# Patient Record
Sex: FEMALE | Race: WHITE | Employment: OTHER | URBAN - METROPOLITAN AREA
[De-identification: names, ages, dates, MRNs, and addresses within clinical notes are randomized per-mention and may not be internally consistent; named-entity substitution may affect disease eponyms.]

---

## 2017-02-27 ENCOUNTER — OFFICE VISIT (OUTPATIENT)
Dept: SURGERY | Age: 82
End: 2017-02-27

## 2017-02-27 VITALS
BODY MASS INDEX: 16.73 KG/M2 | DIASTOLIC BLOOD PRESSURE: 73 MMHG | HEIGHT: 61 IN | SYSTOLIC BLOOD PRESSURE: 142 MMHG | WEIGHT: 88.6 LBS

## 2017-02-27 DIAGNOSIS — F17.200 TOBACCO USE DISORDER: ICD-10-CM

## 2017-02-27 DIAGNOSIS — I70.202 FEMORAL ARTERY OCCLUSION, LEFT (HCC): ICD-10-CM

## 2017-02-27 DIAGNOSIS — E78.5 HYPERLIPIDEMIA LDL GOAL <70: ICD-10-CM

## 2017-02-27 DIAGNOSIS — I73.9 CLAUDICATION OF LEFT LOWER EXTREMITY (HCC): Primary | ICD-10-CM

## 2017-02-27 PROCEDURE — G8484 FLU IMMUNIZE NO ADMIN: HCPCS | Performed by: NURSE PRACTITIONER

## 2017-02-27 PROCEDURE — G8399 PT W/DXA RESULTS DOCUMENT: HCPCS | Performed by: NURSE PRACTITIONER

## 2017-02-27 PROCEDURE — 4040F PNEUMOC VAC/ADMIN/RCVD: CPT | Performed by: NURSE PRACTITIONER

## 2017-02-27 PROCEDURE — G8598 ASA/ANTIPLAT THER USED: HCPCS | Performed by: NURSE PRACTITIONER

## 2017-02-27 PROCEDURE — G8419 CALC BMI OUT NRM PARAM NOF/U: HCPCS | Performed by: NURSE PRACTITIONER

## 2017-02-27 PROCEDURE — 93922 UPR/L XTREMITY ART 2 LEVELS: CPT | Performed by: NURSE PRACTITIONER

## 2017-02-27 PROCEDURE — 99213 OFFICE O/P EST LOW 20 MIN: CPT | Performed by: NURSE PRACTITIONER

## 2017-02-27 PROCEDURE — 1090F PRES/ABSN URINE INCON ASSESS: CPT | Performed by: NURSE PRACTITIONER

## 2017-02-27 PROCEDURE — 4004F PT TOBACCO SCREEN RCVD TLK: CPT | Performed by: NURSE PRACTITIONER

## 2017-02-27 PROCEDURE — G8427 DOCREV CUR MEDS BY ELIG CLIN: HCPCS | Performed by: NURSE PRACTITIONER

## 2017-02-27 PROCEDURE — 1123F ACP DISCUSS/DSCN MKR DOCD: CPT | Performed by: NURSE PRACTITIONER

## 2017-02-27 ASSESSMENT — ENCOUNTER SYMPTOMS: SHORTNESS OF BREATH: 1

## 2017-03-06 ENCOUNTER — OFFICE VISIT (OUTPATIENT)
Dept: INTERNAL MEDICINE CLINIC | Age: 82
End: 2017-03-06

## 2017-03-06 VITALS
DIASTOLIC BLOOD PRESSURE: 80 MMHG | RESPIRATION RATE: 16 BRPM | WEIGHT: 87.5 LBS | SYSTOLIC BLOOD PRESSURE: 142 MMHG | OXYGEN SATURATION: 95 % | BODY MASS INDEX: 16.52 KG/M2 | HEART RATE: 80 BPM | HEIGHT: 61 IN

## 2017-03-06 DIAGNOSIS — D47.3 ESSENTIAL THROMBOCYTOSIS (HCC): ICD-10-CM

## 2017-03-06 DIAGNOSIS — E78.5 HYPERLIPIDEMIA LDL GOAL <70: ICD-10-CM

## 2017-03-06 DIAGNOSIS — K21.9 GASTROESOPHAGEAL REFLUX DISEASE, ESOPHAGITIS PRESENCE NOT SPECIFIED: ICD-10-CM

## 2017-03-06 DIAGNOSIS — J41.0 SIMPLE CHRONIC BRONCHITIS (HCC): ICD-10-CM

## 2017-03-06 DIAGNOSIS — Z72.0 TOBACCO ABUSE: ICD-10-CM

## 2017-03-06 DIAGNOSIS — I10 ESSENTIAL HYPERTENSION: Primary | ICD-10-CM

## 2017-03-06 PROBLEM — Z79.899 ENCOUNTER FOR MONITORING DIURETIC THERAPY: Status: ACTIVE | Noted: 2017-03-06

## 2017-03-06 PROBLEM — Z51.81 ENCOUNTER FOR MONITORING STATIN THERAPY: Status: ACTIVE | Noted: 2017-03-06

## 2017-03-06 PROBLEM — Z79.899 ENCOUNTER FOR MONITORING STATIN THERAPY: Status: ACTIVE | Noted: 2017-03-06

## 2017-03-06 PROBLEM — Z51.81 ENCOUNTER FOR MONITORING DIURETIC THERAPY: Status: ACTIVE | Noted: 2017-03-06

## 2017-03-06 PROCEDURE — G8926 SPIRO NO PERF OR DOC: HCPCS | Performed by: INTERNAL MEDICINE

## 2017-03-06 PROCEDURE — 3023F SPIROM DOC REV: CPT | Performed by: INTERNAL MEDICINE

## 2017-03-06 PROCEDURE — 99213 OFFICE O/P EST LOW 20 MIN: CPT | Performed by: INTERNAL MEDICINE

## 2017-03-06 PROCEDURE — 4040F PNEUMOC VAC/ADMIN/RCVD: CPT | Performed by: INTERNAL MEDICINE

## 2017-03-06 PROCEDURE — G8427 DOCREV CUR MEDS BY ELIG CLIN: HCPCS | Performed by: INTERNAL MEDICINE

## 2017-03-06 PROCEDURE — G8419 CALC BMI OUT NRM PARAM NOF/U: HCPCS | Performed by: INTERNAL MEDICINE

## 2017-03-06 PROCEDURE — G8598 ASA/ANTIPLAT THER USED: HCPCS | Performed by: INTERNAL MEDICINE

## 2017-03-06 PROCEDURE — G8399 PT W/DXA RESULTS DOCUMENT: HCPCS | Performed by: INTERNAL MEDICINE

## 2017-03-06 PROCEDURE — 1090F PRES/ABSN URINE INCON ASSESS: CPT | Performed by: INTERNAL MEDICINE

## 2017-03-06 PROCEDURE — G8484 FLU IMMUNIZE NO ADMIN: HCPCS | Performed by: INTERNAL MEDICINE

## 2017-03-06 PROCEDURE — 1123F ACP DISCUSS/DSCN MKR DOCD: CPT | Performed by: INTERNAL MEDICINE

## 2017-03-06 PROCEDURE — 4004F PT TOBACCO SCREEN RCVD TLK: CPT | Performed by: INTERNAL MEDICINE

## 2017-03-06 ASSESSMENT — COPD QUESTIONNAIRES: COPD: 1

## 2017-03-06 ASSESSMENT — ENCOUNTER SYMPTOMS
SORE THROAT: 0
HEARTBURN: 0
SHORTNESS OF BREATH: 0
ORTHOPNEA: 0
NAUSEA: 0
WHEEZING: 0
SPUTUM PRODUCTION: 1
CHOKING: 0
BELCHING: 0
ABDOMINAL PAIN: 0
COUGH: 0
GLOBUS SENSATION: 0
FREQUENT THROAT CLEARING: 0
HOARSE VOICE: 0
BLURRED VISION: 0
DIFFICULTY BREATHING: 0
CHEST TIGHTNESS: 0

## 2017-03-06 ASSESSMENT — PATIENT HEALTH QUESTIONNAIRE - PHQ9
SUM OF ALL RESPONSES TO PHQ9 QUESTIONS 1 & 2: 0
2. FEELING DOWN, DEPRESSED OR HOPELESS: 0
SUM OF ALL RESPONSES TO PHQ QUESTIONS 1-9: 0
1. LITTLE INTEREST OR PLEASURE IN DOING THINGS: 0

## 2017-05-10 DIAGNOSIS — D47.3 ESSENTIAL THROMBOCYTOSIS (HCC): ICD-10-CM

## 2017-05-10 RX ORDER — ALENDRONATE SODIUM 70 MG/1
TABLET ORAL
Qty: 12 TABLET | Refills: 3 | Status: SHIPPED | OUTPATIENT
Start: 2017-05-10 | End: 2018-06-30 | Stop reason: SDUPTHER

## 2017-05-10 RX ORDER — ANAGRELIDE 0.5 MG/1
CAPSULE ORAL
Qty: 46 CAPSULE | Refills: 0 | Status: SHIPPED | OUTPATIENT
Start: 2017-05-10 | End: 2017-06-06 | Stop reason: SDUPTHER

## 2017-07-27 RX ORDER — METOCLOPRAMIDE 10 MG/1
TABLET ORAL
Qty: 360 TABLET | Refills: 3 | Status: SHIPPED | OUTPATIENT
Start: 2017-07-27 | End: 2019-01-24 | Stop reason: SDUPTHER

## 2017-07-27 RX ORDER — FUROSEMIDE 20 MG/1
TABLET ORAL
Qty: 90 TABLET | Refills: 3 | Status: SHIPPED | OUTPATIENT
Start: 2017-07-27 | End: 2018-06-30 | Stop reason: SDUPTHER

## 2017-08-21 ENCOUNTER — OFFICE VISIT (OUTPATIENT)
Dept: INTERNAL MEDICINE CLINIC | Age: 82
End: 2017-08-21

## 2017-08-21 VITALS
DIASTOLIC BLOOD PRESSURE: 82 MMHG | RESPIRATION RATE: 16 BRPM | SYSTOLIC BLOOD PRESSURE: 112 MMHG | BODY MASS INDEX: 16.42 KG/M2 | WEIGHT: 87 LBS | HEART RATE: 85 BPM | HEIGHT: 61 IN | OXYGEN SATURATION: 96 %

## 2017-08-21 DIAGNOSIS — J41.0 SIMPLE CHRONIC BRONCHITIS (HCC): ICD-10-CM

## 2017-08-21 DIAGNOSIS — E78.5 HYPERLIPIDEMIA LDL GOAL <70: ICD-10-CM

## 2017-08-21 DIAGNOSIS — D47.3 ESSENTIAL THROMBOCYTOSIS (HCC): ICD-10-CM

## 2017-08-21 DIAGNOSIS — K21.9 GASTROESOPHAGEAL REFLUX DISEASE, ESOPHAGITIS PRESENCE NOT SPECIFIED: ICD-10-CM

## 2017-08-21 DIAGNOSIS — I10 ESSENTIAL HYPERTENSION: Primary | ICD-10-CM

## 2017-08-21 LAB
A/G RATIO: 1.8 (ref 1.1–2.2)
ALBUMIN SERPL-MCNC: 4.6 G/DL (ref 3.4–5)
ALP BLD-CCNC: 64 U/L (ref 40–129)
ALT SERPL-CCNC: 12 U/L (ref 10–40)
ANION GAP SERPL CALCULATED.3IONS-SCNC: 17 MMOL/L (ref 3–16)
AST SERPL-CCNC: 20 U/L (ref 15–37)
BILIRUB SERPL-MCNC: 0.5 MG/DL (ref 0–1)
BUN BLDV-MCNC: 11 MG/DL (ref 7–20)
CALCIUM SERPL-MCNC: 9.2 MG/DL (ref 8.3–10.6)
CHLORIDE BLD-SCNC: 97 MMOL/L (ref 99–110)
CO2: 24 MMOL/L (ref 21–32)
CREAT SERPL-MCNC: 1 MG/DL (ref 0.6–1.2)
GFR AFRICAN AMERICAN: >60
GFR NON-AFRICAN AMERICAN: 53
GLOBULIN: 2.5 G/DL
GLUCOSE BLD-MCNC: 85 MG/DL (ref 70–99)
LDL CHOLESTEROL DIRECT: 105 MG/DL
POTASSIUM SERPL-SCNC: 4.8 MMOL/L (ref 3.5–5.1)
SODIUM BLD-SCNC: 138 MMOL/L (ref 136–145)
TOTAL PROTEIN: 7.1 G/DL (ref 6.4–8.2)

## 2017-08-21 PROCEDURE — 36415 COLL VENOUS BLD VENIPUNCTURE: CPT | Performed by: INTERNAL MEDICINE

## 2017-08-21 PROCEDURE — 3023F SPIROM DOC REV: CPT | Performed by: INTERNAL MEDICINE

## 2017-08-21 PROCEDURE — G8598 ASA/ANTIPLAT THER USED: HCPCS | Performed by: INTERNAL MEDICINE

## 2017-08-21 PROCEDURE — G8399 PT W/DXA RESULTS DOCUMENT: HCPCS | Performed by: INTERNAL MEDICINE

## 2017-08-21 PROCEDURE — G8419 CALC BMI OUT NRM PARAM NOF/U: HCPCS | Performed by: INTERNAL MEDICINE

## 2017-08-21 PROCEDURE — 99213 OFFICE O/P EST LOW 20 MIN: CPT | Performed by: INTERNAL MEDICINE

## 2017-08-21 PROCEDURE — G8427 DOCREV CUR MEDS BY ELIG CLIN: HCPCS | Performed by: INTERNAL MEDICINE

## 2017-08-21 PROCEDURE — 1123F ACP DISCUSS/DSCN MKR DOCD: CPT | Performed by: INTERNAL MEDICINE

## 2017-08-21 PROCEDURE — 1090F PRES/ABSN URINE INCON ASSESS: CPT | Performed by: INTERNAL MEDICINE

## 2017-08-21 PROCEDURE — 4040F PNEUMOC VAC/ADMIN/RCVD: CPT | Performed by: INTERNAL MEDICINE

## 2017-08-21 PROCEDURE — G8926 SPIRO NO PERF OR DOC: HCPCS | Performed by: INTERNAL MEDICINE

## 2017-08-21 PROCEDURE — 4004F PT TOBACCO SCREEN RCVD TLK: CPT | Performed by: INTERNAL MEDICINE

## 2017-08-21 RX ORDER — PANTOPRAZOLE SODIUM 40 MG/1
40 TABLET, DELAYED RELEASE ORAL DAILY
Qty: 90 TABLET | Refills: 3 | Status: SHIPPED | OUTPATIENT
Start: 2017-08-21 | End: 2018-08-28 | Stop reason: SDUPTHER

## 2017-08-21 ASSESSMENT — ENCOUNTER SYMPTOMS
NAUSEA: 0
BLURRED VISION: 0
COUGH: 0
HEARTBURN: 0
GLOBUS SENSATION: 0
CHEST TIGHTNESS: 0
WHEEZING: 0
DIFFICULTY BREATHING: 0
FREQUENT THROAT CLEARING: 0
ORTHOPNEA: 0
SORE THROAT: 0
BELCHING: 0
HOARSE VOICE: 0
SPUTUM PRODUCTION: 1
CHOKING: 0
SHORTNESS OF BREATH: 0
ABDOMINAL PAIN: 0

## 2017-08-21 ASSESSMENT — COPD QUESTIONNAIRES: COPD: 1

## 2017-08-28 ENCOUNTER — OFFICE VISIT (OUTPATIENT)
Dept: SURGERY | Age: 82
End: 2017-08-28

## 2017-08-28 VITALS
WEIGHT: 89.2 LBS | HEIGHT: 61 IN | DIASTOLIC BLOOD PRESSURE: 89 MMHG | HEART RATE: 66 BPM | SYSTOLIC BLOOD PRESSURE: 126 MMHG | BODY MASS INDEX: 16.84 KG/M2

## 2017-08-28 DIAGNOSIS — E78.5 HYPERLIPIDEMIA LDL GOAL <70: ICD-10-CM

## 2017-08-28 DIAGNOSIS — F17.200 TOBACCO USE DISORDER: ICD-10-CM

## 2017-08-28 DIAGNOSIS — I73.9 CLAUDICATION OF LEFT LOWER EXTREMITY (HCC): Primary | ICD-10-CM

## 2017-08-28 DIAGNOSIS — I70.202 FEMORAL ARTERY OCCLUSION, LEFT (HCC): ICD-10-CM

## 2017-08-28 PROCEDURE — G8419 CALC BMI OUT NRM PARAM NOF/U: HCPCS | Performed by: NURSE PRACTITIONER

## 2017-08-28 PROCEDURE — 1090F PRES/ABSN URINE INCON ASSESS: CPT | Performed by: NURSE PRACTITIONER

## 2017-08-28 PROCEDURE — G8399 PT W/DXA RESULTS DOCUMENT: HCPCS | Performed by: NURSE PRACTITIONER

## 2017-08-28 PROCEDURE — 4004F PT TOBACCO SCREEN RCVD TLK: CPT | Performed by: NURSE PRACTITIONER

## 2017-08-28 PROCEDURE — 4040F PNEUMOC VAC/ADMIN/RCVD: CPT | Performed by: NURSE PRACTITIONER

## 2017-08-28 PROCEDURE — G8598 ASA/ANTIPLAT THER USED: HCPCS | Performed by: NURSE PRACTITIONER

## 2017-08-28 PROCEDURE — 99213 OFFICE O/P EST LOW 20 MIN: CPT | Performed by: NURSE PRACTITIONER

## 2017-08-28 PROCEDURE — 1123F ACP DISCUSS/DSCN MKR DOCD: CPT | Performed by: NURSE PRACTITIONER

## 2017-08-28 PROCEDURE — G8427 DOCREV CUR MEDS BY ELIG CLIN: HCPCS | Performed by: NURSE PRACTITIONER

## 2017-08-28 ASSESSMENT — ENCOUNTER SYMPTOMS: SHORTNESS OF BREATH: 1

## 2017-10-24 ENCOUNTER — NURSE ONLY (OUTPATIENT)
Dept: INTERNAL MEDICINE CLINIC | Age: 82
End: 2017-10-24

## 2017-10-24 DIAGNOSIS — Z23 NEED FOR INFLUENZA VACCINATION: Primary | ICD-10-CM

## 2017-10-24 PROCEDURE — 90662 IIV NO PRSV INCREASED AG IM: CPT | Performed by: INTERNAL MEDICINE

## 2017-10-24 PROCEDURE — G0008 ADMIN INFLUENZA VIRUS VAC: HCPCS | Performed by: INTERNAL MEDICINE

## 2017-10-24 NOTE — PROGRESS NOTES
Vaccine Information Sheet, \"Influenza - Inactivated\"  given to [place], or parent/legal guardian of  [place] and verbalized understanding. Patient responses:    Have you ever had a reaction to a flu vaccine? No  Are you able to eat eggs without adverse effects? Yes  Do you have any current illness? No  Have you ever had Guillian Gresham Syndrome? No    Flu vaccine given per order. Please see immunization tab.

## 2017-11-29 ENCOUNTER — OFFICE VISIT (OUTPATIENT)
Dept: INTERNAL MEDICINE CLINIC | Age: 82
End: 2017-11-29

## 2017-11-29 VITALS
HEIGHT: 61 IN | SYSTOLIC BLOOD PRESSURE: 152 MMHG | DIASTOLIC BLOOD PRESSURE: 84 MMHG | WEIGHT: 87 LBS | HEART RATE: 80 BPM | RESPIRATION RATE: 16 BRPM | BODY MASS INDEX: 16.42 KG/M2

## 2017-11-29 DIAGNOSIS — K21.9 GASTROESOPHAGEAL REFLUX DISEASE, ESOPHAGITIS PRESENCE NOT SPECIFIED: ICD-10-CM

## 2017-11-29 DIAGNOSIS — E78.5 HYPERLIPIDEMIA LDL GOAL <70: ICD-10-CM

## 2017-11-29 DIAGNOSIS — R09.89 BRUIT OF RIGHT CAROTID ARTERY: ICD-10-CM

## 2017-11-29 DIAGNOSIS — I10 ESSENTIAL HYPERTENSION: Primary | ICD-10-CM

## 2017-11-29 DIAGNOSIS — J41.0 SIMPLE CHRONIC BRONCHITIS (HCC): ICD-10-CM

## 2017-11-29 DIAGNOSIS — D47.3 ESSENTIAL THROMBOCYTOSIS (HCC): ICD-10-CM

## 2017-11-29 PROBLEM — Z79.899 ENCOUNTER FOR MONITORING STATIN THERAPY: Status: RESOLVED | Noted: 2017-03-06 | Resolved: 2017-11-29

## 2017-11-29 PROBLEM — Z51.81 ENCOUNTER FOR MONITORING DIURETIC THERAPY: Status: RESOLVED | Noted: 2017-03-06 | Resolved: 2017-11-29

## 2017-11-29 PROBLEM — Z79.899 ENCOUNTER FOR MONITORING DIURETIC THERAPY: Status: RESOLVED | Noted: 2017-03-06 | Resolved: 2017-11-29

## 2017-11-29 PROBLEM — Z51.81 ENCOUNTER FOR MONITORING STATIN THERAPY: Status: RESOLVED | Noted: 2017-03-06 | Resolved: 2017-11-29

## 2017-11-29 PROCEDURE — 4004F PT TOBACCO SCREEN RCVD TLK: CPT | Performed by: INTERNAL MEDICINE

## 2017-11-29 PROCEDURE — 1123F ACP DISCUSS/DSCN MKR DOCD: CPT | Performed by: INTERNAL MEDICINE

## 2017-11-29 PROCEDURE — 3023F SPIROM DOC REV: CPT | Performed by: INTERNAL MEDICINE

## 2017-11-29 PROCEDURE — G8418 CALC BMI BLW LOW PARAM F/U: HCPCS | Performed by: INTERNAL MEDICINE

## 2017-11-29 PROCEDURE — G8926 SPIRO NO PERF OR DOC: HCPCS | Performed by: INTERNAL MEDICINE

## 2017-11-29 PROCEDURE — 1090F PRES/ABSN URINE INCON ASSESS: CPT | Performed by: INTERNAL MEDICINE

## 2017-11-29 PROCEDURE — G8598 ASA/ANTIPLAT THER USED: HCPCS | Performed by: INTERNAL MEDICINE

## 2017-11-29 PROCEDURE — G8427 DOCREV CUR MEDS BY ELIG CLIN: HCPCS | Performed by: INTERNAL MEDICINE

## 2017-11-29 PROCEDURE — G8399 PT W/DXA RESULTS DOCUMENT: HCPCS | Performed by: INTERNAL MEDICINE

## 2017-11-29 PROCEDURE — G8484 FLU IMMUNIZE NO ADMIN: HCPCS | Performed by: INTERNAL MEDICINE

## 2017-11-29 PROCEDURE — 99213 OFFICE O/P EST LOW 20 MIN: CPT | Performed by: INTERNAL MEDICINE

## 2017-11-29 PROCEDURE — 4040F PNEUMOC VAC/ADMIN/RCVD: CPT | Performed by: INTERNAL MEDICINE

## 2017-11-29 ASSESSMENT — ENCOUNTER SYMPTOMS
WHEEZING: 0
BELCHING: 0
BLURRED VISION: 0
ABDOMINAL PAIN: 0
DIFFICULTY BREATHING: 0
SPUTUM PRODUCTION: 1
NAUSEA: 0
CHOKING: 0
GLOBUS SENSATION: 0
HEARTBURN: 0
COUGH: 0
FREQUENT THROAT CLEARING: 0
ORTHOPNEA: 0
SORE THROAT: 0
CHEST TIGHTNESS: 0
HOARSE VOICE: 0
SHORTNESS OF BREATH: 0

## 2017-11-29 ASSESSMENT — COPD QUESTIONNAIRES: COPD: 1

## 2017-11-29 NOTE — PROGRESS NOTES
Subjective:    Here for recheck of htn, hld, gerd, thrombocytosis and copd  Patient ID: Elmer Bai is a 80 y.o. female. Hypertension   This is a chronic problem. The current episode started more than 1 year ago. The problem is unchanged. The problem is controlled. Pertinent negatives include no blurred vision, chest pain, headaches, malaise/fatigue, neck pain, orthopnea, palpitations, peripheral edema, PND, shortness of breath or sweats. (C/o lara) There are no associated agents to hypertension. Risk factors for coronary artery disease include dyslipidemia, smoking/tobacco exposure and post-menopausal state. Past treatments include diuretics. The current treatment provides significant improvement. There are no compliance problems. Hypertensive end-organ damage includes PVD. Hyperlipidemia   This is a chronic problem. The current episode started more than 1 year ago. The problem is controlled. Recent lipid tests were reviewed and are normal. Factors aggravating her hyperlipidemia include smoking. Pertinent negatives include no chest pain, focal sensory loss, focal weakness, leg pain, myalgias or shortness of breath. Current antihyperlipidemic treatment includes statins. The current treatment provides significant improvement of lipids. There are no compliance problems. Risk factors for coronary artery disease include hypertension and dyslipidemia. Gastroesophageal Reflux   She reports no abdominal pain, no belching, no chest pain, no choking, no coughing, no dysphagia, no early satiety, no globus sensation, no heartburn, no hoarse voice, no nausea, no sore throat or no wheezing. This is a chronic problem. The current episode started more than 1 year ago. The problem occurs rarely. The problem has been unchanged. Nothing aggravates the symptoms. Pertinent negatives include no anemia, fatigue, muscle weakness or orthopnea. There are no known risk factors. She has tried a PPI for the symptoms.  The (LASIX) 20 MG tablet TAKE 1 TABLET BY MOUTH DAILY Yes Valentina Evans MD   metoclopramide (REGLAN) 10 MG tablet TAKE 1 TABLET BY MOUTH FOUR TIMES DAILY Yes Valentina Evans MD   anagrelide (AGRYLIN) 0.5 MG capsule TAKE ONE CAPSULE BY MOUTH ON MONDAY, WEDNESDAY, AND FRIDAY AND 2 CAPSULES ON TUESDAY, [address] [facility], SATURDAY, AND SUNDAY Yes Joseph Matt MD   alendronate (FOSAMAX) 70 MG tablet TAKE 1 TABLET BY MOUTH EVERY 7 DAYS Yes Valentina Evans MD   atorvastatin (LIPITOR) 40 MG tablet TAKE 1 TABLET BY MOUTH DAILY Yes Valentina Evans MD   ferrous sulfate 325 (65 FE) MG tablet TAKE 1 TABLET BY MOUTH DAILY FOR 3 DAYS, THEN 1 TABLET TWICE A DAY FOR 3 DAYS, THEN ONE TABLET 3 TIMES A DAY Yes M Angele Lefort, MD   albuterol sulfate HFA (PROAIR HFA) 108 (90 BASE) MCG/ACT inhaler INHALE TWO PUFFS BY MOUTH FOUR TIMES DAILY AS NEEDED FOR SHORTNESS OF BREATH Yes Valentina Evans MD   aspirin 81 MG tablet Take 162 mg by mouth. Yes Historical Provider, MD         BP (!) 152/84   Pulse 80   Resp 16   Ht 5' 1\" (1.549 m)   Wt 87 lb (39.5 kg)   BMI 16.44 kg/m²       Objective:   Physical Exam   Constitutional: She appears well-developed and well-nourished. No distress. Neck: No JVD present.   + right bruit. Cardiovascular: Normal rate, regular rhythm and normal heart sounds. Exam reveals no gallop and no friction rub. No murmur heard. Pulmonary/Chest: Effort normal and breath sounds normal. No respiratory distress. She has no wheezes. She has no rales. Musculoskeletal: She exhibits no edema. Skin: Skin is warm and dry. No rash noted. She is not diaphoretic. No erythema. Vitals reviewed. Assessment:      1. Essential hypertension    2. Hyperlipidemia LDL goal <70    3. Gastroesophageal reflux disease, esophagitis presence not specified    4. Simple chronic bronchitis ([address]    5. Essential thrombocytosis (HCC)    6.  Bruit of right carotid artery             Plan:      Kate Kelly was seen today

## 2018-01-02 RX ORDER — ATORVASTATIN CALCIUM 40 MG/1
40 TABLET, FILM COATED ORAL DAILY
Qty: 30 TABLET | Refills: 12 | Status: SHIPPED | OUTPATIENT
Start: 2018-01-02 | End: 2018-10-24 | Stop reason: SDUPTHER

## 2018-02-07 ENCOUNTER — TELEPHONE (OUTPATIENT)
Dept: INTERNAL MEDICINE CLINIC | Age: 83
End: 2018-02-07

## 2018-02-07 DIAGNOSIS — D50.9 IRON DEFICIENCY ANEMIA, UNSPECIFIED IRON DEFICIENCY ANEMIA TYPE: ICD-10-CM

## 2018-02-07 RX ORDER — FERROUS SULFATE 325(65) MG
TABLET ORAL
Qty: 90 TABLET | Refills: 1 | Status: SHIPPED | OUTPATIENT
Start: 2018-02-07 | End: 2018-08-28 | Stop reason: SDUPTHER

## 2018-02-28 ENCOUNTER — TELEPHONE (OUTPATIENT)
Dept: PHARMACY | Facility: CLINIC | Age: 83
End: 2018-02-28

## 2018-03-01 ENCOUNTER — OFFICE VISIT (OUTPATIENT)
Dept: SURGERY | Age: 83
End: 2018-03-01

## 2018-03-01 VITALS
SYSTOLIC BLOOD PRESSURE: 144 MMHG | DIASTOLIC BLOOD PRESSURE: 71 MMHG | BODY MASS INDEX: 16.77 KG/M2 | WEIGHT: 88.8 LBS | HEART RATE: 67 BPM | HEIGHT: 61 IN

## 2018-03-01 DIAGNOSIS — E78.5 HYPERLIPIDEMIA LDL GOAL <70: ICD-10-CM

## 2018-03-01 DIAGNOSIS — I70.202 FEMORAL ARTERY OCCLUSION, LEFT (HCC): ICD-10-CM

## 2018-03-01 DIAGNOSIS — F17.200 TOBACCO USE DISORDER: ICD-10-CM

## 2018-03-01 DIAGNOSIS — I73.9 CLAUDICATION OF LEFT LOWER EXTREMITY (HCC): Primary | ICD-10-CM

## 2018-03-01 PROCEDURE — G8427 DOCREV CUR MEDS BY ELIG CLIN: HCPCS | Performed by: NURSE PRACTITIONER

## 2018-03-01 PROCEDURE — G8419 CALC BMI OUT NRM PARAM NOF/U: HCPCS | Performed by: NURSE PRACTITIONER

## 2018-03-01 PROCEDURE — G8399 PT W/DXA RESULTS DOCUMENT: HCPCS | Performed by: NURSE PRACTITIONER

## 2018-03-01 PROCEDURE — 99213 OFFICE O/P EST LOW 20 MIN: CPT | Performed by: NURSE PRACTITIONER

## 2018-03-01 PROCEDURE — G8598 ASA/ANTIPLAT THER USED: HCPCS | Performed by: NURSE PRACTITIONER

## 2018-03-01 PROCEDURE — G8484 FLU IMMUNIZE NO ADMIN: HCPCS | Performed by: NURSE PRACTITIONER

## 2018-03-01 PROCEDURE — 1123F ACP DISCUSS/DSCN MKR DOCD: CPT | Performed by: NURSE PRACTITIONER

## 2018-03-01 PROCEDURE — 4040F PNEUMOC VAC/ADMIN/RCVD: CPT | Performed by: NURSE PRACTITIONER

## 2018-03-01 PROCEDURE — 1090F PRES/ABSN URINE INCON ASSESS: CPT | Performed by: NURSE PRACTITIONER

## 2018-03-01 PROCEDURE — 4004F PT TOBACCO SCREEN RCVD TLK: CPT | Performed by: NURSE PRACTITIONER

## 2018-03-01 NOTE — PROGRESS NOTES
pain.   Neurological: She is alert. She has normal strength. No cranial nerve deficit or sensory deficit. Coordination normal.   Skin: Skin is warm and intact. No bruising and no ecchymosis noted. No cyanosis. Psychiatric: She has a normal mood and affect. Her speech is normal and behavior is normal. Judgment and thought content normal. Cognition and memory are normal.       Assessment:      1. Claudication of left lower extremity ([address]    2. Femoral artery occlusion, left (HCC)    3. Hyperlipidemia LDL goal <70 - stable, on atorvastatin   4. Tobacco use disorder   The patient was instructed/counseled on smoking cessation. KIKE's were done today. PATIENT EDUCATION focused on smoking and its affect on arteries. Patient reports that she continues to smoke 6-8 cigarettes per day, but she plans to cut down further. For her arteries, she was encouraged to participate in exercise as tolerated with focus on the leg(s) including, daily walking, repetitive toe pointing, and calve muscle pumping/stretching as tolerated. Patient verbalized understanding. Plan:         Return in about 6 months (around 9/1/2018) for KIKE's and office visit.

## 2018-03-07 ENCOUNTER — OFFICE VISIT (OUTPATIENT)
Dept: INTERNAL MEDICINE CLINIC | Age: 83
End: 2018-03-07

## 2018-03-07 VITALS
WEIGHT: 87 LBS | SYSTOLIC BLOOD PRESSURE: 110 MMHG | HEART RATE: 77 BPM | DIASTOLIC BLOOD PRESSURE: 76 MMHG | OXYGEN SATURATION: 97 % | BODY MASS INDEX: 16.44 KG/M2

## 2018-03-07 DIAGNOSIS — R09.89 BRUIT OF RIGHT CAROTID ARTERY: ICD-10-CM

## 2018-03-07 DIAGNOSIS — K21.9 GASTROESOPHAGEAL REFLUX DISEASE, ESOPHAGITIS PRESENCE NOT SPECIFIED: ICD-10-CM

## 2018-03-07 DIAGNOSIS — I10 ESSENTIAL HYPERTENSION: Primary | ICD-10-CM

## 2018-03-07 DIAGNOSIS — J41.0 SIMPLE CHRONIC BRONCHITIS (HCC): ICD-10-CM

## 2018-03-07 DIAGNOSIS — D47.3 ESSENTIAL THROMBOCYTOSIS (HCC): ICD-10-CM

## 2018-03-07 DIAGNOSIS — E78.5 HYPERLIPIDEMIA LDL GOAL <70: ICD-10-CM

## 2018-03-07 LAB
A/G RATIO: 1.6 (ref 1.1–2.2)
ALBUMIN SERPL-MCNC: 4.4 G/DL (ref 3.4–5)
ALP BLD-CCNC: 59 U/L (ref 40–129)
ALT SERPL-CCNC: 11 U/L (ref 10–40)
ANION GAP SERPL CALCULATED.3IONS-SCNC: 16 MMOL/L (ref 3–16)
AST SERPL-CCNC: 22 U/L (ref 15–37)
BILIRUB SERPL-MCNC: 0.4 MG/DL (ref 0–1)
BUN BLDV-MCNC: 19 MG/DL (ref 7–20)
CALCIUM SERPL-MCNC: 9.3 MG/DL (ref 8.3–10.6)
CHLORIDE BLD-SCNC: 98 MMOL/L (ref 99–110)
CHOLESTEROL, TOTAL: 183 MG/DL (ref 0–199)
CO2: 25 MMOL/L (ref 21–32)
CREAT SERPL-MCNC: 1.2 MG/DL (ref 0.6–1.2)
GFR AFRICAN AMERICAN: 52
GFR NON-AFRICAN AMERICAN: 43
GLOBULIN: 2.8 G/DL
GLUCOSE BLD-MCNC: 84 MG/DL (ref 70–99)
HDLC SERPL-MCNC: 56 MG/DL (ref 40–60)
LDL CHOLESTEROL CALCULATED: 97 MG/DL
POTASSIUM SERPL-SCNC: 4.7 MMOL/L (ref 3.5–5.1)
SODIUM BLD-SCNC: 139 MMOL/L (ref 136–145)
TOTAL PROTEIN: 7.2 G/DL (ref 6.4–8.2)
TRIGL SERPL-MCNC: 149 MG/DL (ref 0–150)
VLDLC SERPL CALC-MCNC: 30 MG/DL

## 2018-03-07 PROCEDURE — G8484 FLU IMMUNIZE NO ADMIN: HCPCS | Performed by: INTERNAL MEDICINE

## 2018-03-07 PROCEDURE — G8419 CALC BMI OUT NRM PARAM NOF/U: HCPCS | Performed by: INTERNAL MEDICINE

## 2018-03-07 PROCEDURE — 99214 OFFICE O/P EST MOD 30 MIN: CPT | Performed by: INTERNAL MEDICINE

## 2018-03-07 PROCEDURE — 4040F PNEUMOC VAC/ADMIN/RCVD: CPT | Performed by: INTERNAL MEDICINE

## 2018-03-07 PROCEDURE — 36415 COLL VENOUS BLD VENIPUNCTURE: CPT | Performed by: INTERNAL MEDICINE

## 2018-03-07 PROCEDURE — 1123F ACP DISCUSS/DSCN MKR DOCD: CPT | Performed by: INTERNAL MEDICINE

## 2018-03-07 PROCEDURE — 4004F PT TOBACCO SCREEN RCVD TLK: CPT | Performed by: INTERNAL MEDICINE

## 2018-03-07 PROCEDURE — G8427 DOCREV CUR MEDS BY ELIG CLIN: HCPCS | Performed by: INTERNAL MEDICINE

## 2018-03-07 PROCEDURE — G8598 ASA/ANTIPLAT THER USED: HCPCS | Performed by: INTERNAL MEDICINE

## 2018-03-07 PROCEDURE — 3023F SPIROM DOC REV: CPT | Performed by: INTERNAL MEDICINE

## 2018-03-07 PROCEDURE — 1090F PRES/ABSN URINE INCON ASSESS: CPT | Performed by: INTERNAL MEDICINE

## 2018-03-07 PROCEDURE — G8399 PT W/DXA RESULTS DOCUMENT: HCPCS | Performed by: INTERNAL MEDICINE

## 2018-03-07 PROCEDURE — G8926 SPIRO NO PERF OR DOC: HCPCS | Performed by: INTERNAL MEDICINE

## 2018-03-07 ASSESSMENT — PATIENT HEALTH QUESTIONNAIRE - PHQ9
SUM OF ALL RESPONSES TO PHQ9 QUESTIONS 1 & 2: 0
1. LITTLE INTEREST OR PLEASURE IN DOING THINGS: 0
SUM OF ALL RESPONSES TO PHQ QUESTIONS 1-9: 0
2. FEELING DOWN, DEPRESSED OR HOPELESS: 0

## 2018-03-07 ASSESSMENT — ENCOUNTER SYMPTOMS
CHOKING: 0
ABDOMINAL PAIN: 0
DIFFICULTY BREATHING: 0
GLOBUS SENSATION: 0
FREQUENT THROAT CLEARING: 0
WHEEZING: 0
SHORTNESS OF BREATH: 0
BELCHING: 0
SPUTUM PRODUCTION: 1
BLURRED VISION: 0
NAUSEA: 0
COUGH: 0
ORTHOPNEA: 0
CHEST TIGHTNESS: 0
HEARTBURN: 0
HOARSE VOICE: 0
SORE THROAT: 0

## 2018-03-07 ASSESSMENT — COPD QUESTIONNAIRES: COPD: 1

## 2018-07-02 RX ORDER — ALENDRONATE SODIUM 70 MG/1
TABLET ORAL
Qty: 12 TABLET | Refills: 3 | Status: ON HOLD | OUTPATIENT
Start: 2018-07-02 | End: 2020-04-01 | Stop reason: ALTCHOICE

## 2018-07-02 RX ORDER — FUROSEMIDE 20 MG/1
TABLET ORAL
Qty: 90 TABLET | Refills: 3 | Status: SHIPPED | OUTPATIENT
Start: 2018-07-02 | End: 2019-07-17 | Stop reason: SDUPTHER

## 2018-07-05 ENCOUNTER — OFFICE VISIT (OUTPATIENT)
Dept: INTERNAL MEDICINE CLINIC | Age: 83
End: 2018-07-05

## 2018-07-05 VITALS
DIASTOLIC BLOOD PRESSURE: 84 MMHG | BODY MASS INDEX: 15.87 KG/M2 | WEIGHT: 84 LBS | HEART RATE: 80 BPM | SYSTOLIC BLOOD PRESSURE: 120 MMHG | RESPIRATION RATE: 16 BRPM

## 2018-07-05 DIAGNOSIS — E78.5 HYPERLIPIDEMIA LDL GOAL <70: ICD-10-CM

## 2018-07-05 DIAGNOSIS — R09.89 BRUIT OF RIGHT CAROTID ARTERY: ICD-10-CM

## 2018-07-05 DIAGNOSIS — J41.0 SIMPLE CHRONIC BRONCHITIS (HCC): ICD-10-CM

## 2018-07-05 DIAGNOSIS — I10 ESSENTIAL HYPERTENSION: Primary | ICD-10-CM

## 2018-07-05 DIAGNOSIS — F17.200 TOBACCO USE DISORDER: ICD-10-CM

## 2018-07-05 DIAGNOSIS — D47.3 ESSENTIAL THROMBOCYTOSIS (HCC): ICD-10-CM

## 2018-07-05 DIAGNOSIS — K21.9 GASTROESOPHAGEAL REFLUX DISEASE, ESOPHAGITIS PRESENCE NOT SPECIFIED: ICD-10-CM

## 2018-07-05 PROCEDURE — G8427 DOCREV CUR MEDS BY ELIG CLIN: HCPCS | Performed by: INTERNAL MEDICINE

## 2018-07-05 PROCEDURE — G8399 PT W/DXA RESULTS DOCUMENT: HCPCS | Performed by: INTERNAL MEDICINE

## 2018-07-05 PROCEDURE — G8598 ASA/ANTIPLAT THER USED: HCPCS | Performed by: INTERNAL MEDICINE

## 2018-07-05 PROCEDURE — 1123F ACP DISCUSS/DSCN MKR DOCD: CPT | Performed by: INTERNAL MEDICINE

## 2018-07-05 PROCEDURE — 4040F PNEUMOC VAC/ADMIN/RCVD: CPT | Performed by: INTERNAL MEDICINE

## 2018-07-05 PROCEDURE — 1090F PRES/ABSN URINE INCON ASSESS: CPT | Performed by: INTERNAL MEDICINE

## 2018-07-05 PROCEDURE — G8419 CALC BMI OUT NRM PARAM NOF/U: HCPCS | Performed by: INTERNAL MEDICINE

## 2018-07-05 PROCEDURE — 4004F PT TOBACCO SCREEN RCVD TLK: CPT | Performed by: INTERNAL MEDICINE

## 2018-07-05 PROCEDURE — G8926 SPIRO NO PERF OR DOC: HCPCS | Performed by: INTERNAL MEDICINE

## 2018-07-05 PROCEDURE — 3023F SPIROM DOC REV: CPT | Performed by: INTERNAL MEDICINE

## 2018-07-05 PROCEDURE — 99213 OFFICE O/P EST LOW 20 MIN: CPT | Performed by: INTERNAL MEDICINE

## 2018-07-05 ASSESSMENT — ENCOUNTER SYMPTOMS
CHEST TIGHTNESS: 0
FREQUENT THROAT CLEARING: 0
BLURRED VISION: 0
SORE THROAT: 0
DIFFICULTY BREATHING: 0
GLOBUS SENSATION: 0
CHOKING: 0
BELCHING: 0
ORTHOPNEA: 0
ABDOMINAL PAIN: 0
SPUTUM PRODUCTION: 1
NAUSEA: 0
HOARSE VOICE: 0
WHEEZING: 0
COUGH: 0
SHORTNESS OF BREATH: 0
HEARTBURN: 0

## 2018-07-05 ASSESSMENT — COPD QUESTIONNAIRES: COPD: 1

## 2018-08-28 DIAGNOSIS — K21.9 GASTROESOPHAGEAL REFLUX DISEASE, ESOPHAGITIS PRESENCE NOT SPECIFIED: ICD-10-CM

## 2018-08-28 DIAGNOSIS — D50.9 IRON DEFICIENCY ANEMIA, UNSPECIFIED IRON DEFICIENCY ANEMIA TYPE: ICD-10-CM

## 2018-08-28 RX ORDER — PANTOPRAZOLE SODIUM 40 MG/1
40 TABLET, DELAYED RELEASE ORAL DAILY
Qty: 90 TABLET | Refills: 3 | Status: SHIPPED | OUTPATIENT
Start: 2018-08-28 | End: 2019-08-08 | Stop reason: SDUPTHER

## 2018-08-28 RX ORDER — FERROUS SULFATE 325(65) MG
TABLET ORAL
Qty: 90 TABLET | Refills: 3 | Status: SHIPPED | OUTPATIENT
Start: 2018-08-28 | End: 2020-02-14

## 2018-09-05 ENCOUNTER — OFFICE VISIT (OUTPATIENT)
Dept: SURGERY | Age: 83
End: 2018-09-05

## 2018-09-05 VITALS
WEIGHT: 85.8 LBS | BODY MASS INDEX: 16.85 KG/M2 | HEIGHT: 60 IN | DIASTOLIC BLOOD PRESSURE: 82 MMHG | SYSTOLIC BLOOD PRESSURE: 138 MMHG

## 2018-09-05 DIAGNOSIS — I73.9 PERIPHERAL VASCULAR DISEASE (HCC): Primary | ICD-10-CM

## 2018-09-05 DIAGNOSIS — E78.5 HYPERLIPIDEMIA LDL GOAL <70: ICD-10-CM

## 2018-09-05 DIAGNOSIS — I70.202 FEMORAL ARTERY OCCLUSION, LEFT (HCC): ICD-10-CM

## 2018-09-05 DIAGNOSIS — I10 ESSENTIAL HYPERTENSION: ICD-10-CM

## 2018-09-05 PROCEDURE — 1123F ACP DISCUSS/DSCN MKR DOCD: CPT | Performed by: NURSE PRACTITIONER

## 2018-09-05 PROCEDURE — 1090F PRES/ABSN URINE INCON ASSESS: CPT | Performed by: NURSE PRACTITIONER

## 2018-09-05 PROCEDURE — 99213 OFFICE O/P EST LOW 20 MIN: CPT | Performed by: NURSE PRACTITIONER

## 2018-09-05 PROCEDURE — G8427 DOCREV CUR MEDS BY ELIG CLIN: HCPCS | Performed by: NURSE PRACTITIONER

## 2018-09-05 PROCEDURE — 1101F PT FALLS ASSESS-DOCD LE1/YR: CPT | Performed by: NURSE PRACTITIONER

## 2018-09-05 PROCEDURE — G8399 PT W/DXA RESULTS DOCUMENT: HCPCS | Performed by: NURSE PRACTITIONER

## 2018-09-05 PROCEDURE — G8419 CALC BMI OUT NRM PARAM NOF/U: HCPCS | Performed by: NURSE PRACTITIONER

## 2018-09-05 PROCEDURE — G8598 ASA/ANTIPLAT THER USED: HCPCS | Performed by: NURSE PRACTITIONER

## 2018-09-05 PROCEDURE — 4040F PNEUMOC VAC/ADMIN/RCVD: CPT | Performed by: NURSE PRACTITIONER

## 2018-09-05 PROCEDURE — 4004F PT TOBACCO SCREEN RCVD TLK: CPT | Performed by: NURSE PRACTITIONER

## 2018-09-05 NOTE — PATIENT INSTRUCTIONS
PATIENT EDUCATION focused on smoking and its affect on arteries. Patient reports that she continues to smoke 6-8 cigarettes per day, but she plans to cut down further. For her arteries, she was encouraged to participate in exercise as tolerated with focus on the leg(s) including, daily walking, repetitive toe pointing, and calve muscle pumping/stretching as tolerated. Patient verbalized understanding. Return in about 6 months (around 3/5/2019) for KIKE's and office visit.

## 2018-10-18 ENCOUNTER — OFFICE VISIT (OUTPATIENT)
Dept: INTERNAL MEDICINE CLINIC | Age: 83
End: 2018-10-18
Payer: MEDICARE

## 2018-10-18 VITALS
DIASTOLIC BLOOD PRESSURE: 80 MMHG | HEIGHT: 60 IN | RESPIRATION RATE: 16 BRPM | SYSTOLIC BLOOD PRESSURE: 148 MMHG | BODY MASS INDEX: 16.69 KG/M2 | WEIGHT: 85 LBS | HEART RATE: 76 BPM

## 2018-10-18 DIAGNOSIS — R09.89 BRUIT OF RIGHT CAROTID ARTERY: ICD-10-CM

## 2018-10-18 DIAGNOSIS — K21.9 GASTROESOPHAGEAL REFLUX DISEASE, ESOPHAGITIS PRESENCE NOT SPECIFIED: ICD-10-CM

## 2018-10-18 DIAGNOSIS — F17.200 TOBACCO USE DISORDER: ICD-10-CM

## 2018-10-18 DIAGNOSIS — E78.5 HYPERLIPIDEMIA LDL GOAL <70: ICD-10-CM

## 2018-10-18 DIAGNOSIS — J41.0 SIMPLE CHRONIC BRONCHITIS (HCC): ICD-10-CM

## 2018-10-18 DIAGNOSIS — I10 ESSENTIAL HYPERTENSION: Primary | ICD-10-CM

## 2018-10-18 DIAGNOSIS — D47.3 ESSENTIAL THROMBOCYTOSIS (HCC): ICD-10-CM

## 2018-10-18 LAB
A/G RATIO: 1.6 (ref 1.1–2.2)
ALBUMIN SERPL-MCNC: 4.5 G/DL (ref 3.4–5)
ALP BLD-CCNC: 78 U/L (ref 40–129)
ALT SERPL-CCNC: 11 U/L (ref 10–40)
ANION GAP SERPL CALCULATED.3IONS-SCNC: 13 MMOL/L (ref 3–16)
AST SERPL-CCNC: 19 U/L (ref 15–37)
BILIRUB SERPL-MCNC: 0.4 MG/DL (ref 0–1)
BUN BLDV-MCNC: 16 MG/DL (ref 7–20)
CALCIUM SERPL-MCNC: 9.7 MG/DL (ref 8.3–10.6)
CHLORIDE BLD-SCNC: 99 MMOL/L (ref 99–110)
CO2: 28 MMOL/L (ref 21–32)
CREAT SERPL-MCNC: 1.2 MG/DL (ref 0.6–1.2)
GFR AFRICAN AMERICAN: 52
GFR NON-AFRICAN AMERICAN: 43
GLOBULIN: 2.8 G/DL
GLUCOSE BLD-MCNC: 84 MG/DL (ref 70–99)
LDL CHOLESTEROL DIRECT: 113 MG/DL
POTASSIUM SERPL-SCNC: 4.7 MMOL/L (ref 3.5–5.1)
SODIUM BLD-SCNC: 140 MMOL/L (ref 136–145)
TOTAL PROTEIN: 7.3 G/DL (ref 6.4–8.2)

## 2018-10-18 PROCEDURE — G8399 PT W/DXA RESULTS DOCUMENT: HCPCS | Performed by: INTERNAL MEDICINE

## 2018-10-18 PROCEDURE — 1101F PT FALLS ASSESS-DOCD LE1/YR: CPT | Performed by: INTERNAL MEDICINE

## 2018-10-18 PROCEDURE — G8926 SPIRO NO PERF OR DOC: HCPCS | Performed by: INTERNAL MEDICINE

## 2018-10-18 PROCEDURE — 4040F PNEUMOC VAC/ADMIN/RCVD: CPT | Performed by: INTERNAL MEDICINE

## 2018-10-18 PROCEDURE — 99214 OFFICE O/P EST MOD 30 MIN: CPT | Performed by: INTERNAL MEDICINE

## 2018-10-18 PROCEDURE — G0008 ADMIN INFLUENZA VIRUS VAC: HCPCS | Performed by: INTERNAL MEDICINE

## 2018-10-18 PROCEDURE — 1090F PRES/ABSN URINE INCON ASSESS: CPT | Performed by: INTERNAL MEDICINE

## 2018-10-18 PROCEDURE — 36415 COLL VENOUS BLD VENIPUNCTURE: CPT | Performed by: INTERNAL MEDICINE

## 2018-10-18 PROCEDURE — G8598 ASA/ANTIPLAT THER USED: HCPCS | Performed by: INTERNAL MEDICINE

## 2018-10-18 PROCEDURE — 3023F SPIROM DOC REV: CPT | Performed by: INTERNAL MEDICINE

## 2018-10-18 PROCEDURE — G8482 FLU IMMUNIZE ORDER/ADMIN: HCPCS | Performed by: INTERNAL MEDICINE

## 2018-10-18 PROCEDURE — 4004F PT TOBACCO SCREEN RCVD TLK: CPT | Performed by: INTERNAL MEDICINE

## 2018-10-18 PROCEDURE — 90662 IIV NO PRSV INCREASED AG IM: CPT | Performed by: INTERNAL MEDICINE

## 2018-10-18 PROCEDURE — G8419 CALC BMI OUT NRM PARAM NOF/U: HCPCS | Performed by: INTERNAL MEDICINE

## 2018-10-18 PROCEDURE — G8427 DOCREV CUR MEDS BY ELIG CLIN: HCPCS | Performed by: INTERNAL MEDICINE

## 2018-10-18 PROCEDURE — 1123F ACP DISCUSS/DSCN MKR DOCD: CPT | Performed by: INTERNAL MEDICINE

## 2018-10-18 ASSESSMENT — ENCOUNTER SYMPTOMS
CHOKING: 0
CHEST TIGHTNESS: 0
HOARSE VOICE: 0
HEARTBURN: 0
COUGH: 1
DIFFICULTY BREATHING: 0
WHEEZING: 0
SORE THROAT: 0
SHORTNESS OF BREATH: 0
FREQUENT THROAT CLEARING: 0
BELCHING: 0
ORTHOPNEA: 0
SPUTUM PRODUCTION: 1
BLURRED VISION: 0
GLOBUS SENSATION: 0
NAUSEA: 0
ABDOMINAL PAIN: 0

## 2018-10-18 ASSESSMENT — COPD QUESTIONNAIRES: COPD: 1

## 2018-10-18 NOTE — PROGRESS NOTES
Subjective:    Here for recheck of htn, hld, gerd, thrombocytosis and copd  Patient ID: Gunner Crandall is a 80 y.o. female. Hypertension   This is a chronic problem. The current episode started more than 1 year ago. The problem is unchanged. The problem is controlled. Pertinent negatives include no blurred vision, chest pain, headaches, malaise/fatigue, neck pain, orthopnea, palpitations, peripheral edema, PND, shortness of breath or sweats. There are no associated agents to hypertension. Risk factors for coronary artery disease include dyslipidemia, smoking/tobacco exposure and post-menopausal state. Past treatments include diuretics. The current treatment provides significant improvement. There are no compliance problems. Hypertensive end-organ damage includes PVD. Hyperlipidemia   This is a chronic problem. The current episode started more than 1 year ago. The problem is controlled. Recent lipid tests were reviewed and are normal. Factors aggravating her hyperlipidemia include smoking. Pertinent negatives include no chest pain, focal sensory loss, focal weakness, leg pain, myalgias or shortness of breath. Current antihyperlipidemic treatment includes statins. The current treatment provides significant improvement of lipids. There are no compliance problems. Risk factors for coronary artery disease include hypertension, dyslipidemia and post-menopausal.   Gastroesophageal Reflux   She complains of coughing. She reports no abdominal pain, no belching, no chest pain, no choking, no dysphagia, no early satiety, no globus sensation, no heartburn, no hoarse voice, no nausea, no sore throat or no wheezing. This is a chronic problem. The current episode started more than 1 year ago. The problem occurs rarely. The problem has been unchanged. Nothing aggravates the symptoms. Pertinent negatives include no anemia, fatigue, melena, muscle weakness, orthopnea or weight loss. There are no known risk factors.  She has

## 2018-10-24 RX ORDER — ATORVASTATIN CALCIUM 40 MG/1
40 TABLET, FILM COATED ORAL DAILY
Qty: 30 TABLET | Refills: 12 | Status: SHIPPED | OUTPATIENT
Start: 2018-10-24 | End: 2019-01-24 | Stop reason: SDUPTHER

## 2019-01-24 ENCOUNTER — OFFICE VISIT (OUTPATIENT)
Dept: INTERNAL MEDICINE CLINIC | Age: 84
End: 2019-01-24
Payer: MEDICARE

## 2019-01-24 VITALS
HEIGHT: 60 IN | OXYGEN SATURATION: 95 % | DIASTOLIC BLOOD PRESSURE: 82 MMHG | SYSTOLIC BLOOD PRESSURE: 146 MMHG | RESPIRATION RATE: 16 BRPM | HEART RATE: 76 BPM | WEIGHT: 83 LBS | BODY MASS INDEX: 16.3 KG/M2

## 2019-01-24 DIAGNOSIS — D47.3 ESSENTIAL THROMBOCYTOSIS (HCC): ICD-10-CM

## 2019-01-24 DIAGNOSIS — I70.202 FEMORAL ARTERY OCCLUSION, LEFT (HCC): ICD-10-CM

## 2019-01-24 DIAGNOSIS — J41.0 SIMPLE CHRONIC BRONCHITIS (HCC): ICD-10-CM

## 2019-01-24 DIAGNOSIS — R09.89 BRUIT OF RIGHT CAROTID ARTERY: ICD-10-CM

## 2019-01-24 DIAGNOSIS — I10 ESSENTIAL HYPERTENSION: Primary | ICD-10-CM

## 2019-01-24 DIAGNOSIS — E78.5 HYPERLIPIDEMIA LDL GOAL <70: ICD-10-CM

## 2019-01-24 DIAGNOSIS — K21.9 GASTROESOPHAGEAL REFLUX DISEASE, ESOPHAGITIS PRESENCE NOT SPECIFIED: ICD-10-CM

## 2019-01-24 DIAGNOSIS — F17.200 TOBACCO USE DISORDER: ICD-10-CM

## 2019-01-24 DIAGNOSIS — I73.9 PERIPHERAL VASCULAR DISEASE (HCC): ICD-10-CM

## 2019-01-24 DIAGNOSIS — N18.30 STAGE 3 CHRONIC KIDNEY DISEASE (HCC): ICD-10-CM

## 2019-01-24 DIAGNOSIS — I74.5 ILIAC ARTERY OCCLUSION, LEFT (HCC): ICD-10-CM

## 2019-01-24 PROCEDURE — 99214 OFFICE O/P EST MOD 30 MIN: CPT | Performed by: INTERNAL MEDICINE

## 2019-01-24 PROCEDURE — 1123F ACP DISCUSS/DSCN MKR DOCD: CPT | Performed by: INTERNAL MEDICINE

## 2019-01-24 PROCEDURE — G8598 ASA/ANTIPLAT THER USED: HCPCS | Performed by: INTERNAL MEDICINE

## 2019-01-24 PROCEDURE — G8482 FLU IMMUNIZE ORDER/ADMIN: HCPCS | Performed by: INTERNAL MEDICINE

## 2019-01-24 PROCEDURE — 3023F SPIROM DOC REV: CPT | Performed by: INTERNAL MEDICINE

## 2019-01-24 PROCEDURE — 4004F PT TOBACCO SCREEN RCVD TLK: CPT | Performed by: INTERNAL MEDICINE

## 2019-01-24 PROCEDURE — G8419 CALC BMI OUT NRM PARAM NOF/U: HCPCS | Performed by: INTERNAL MEDICINE

## 2019-01-24 PROCEDURE — G8427 DOCREV CUR MEDS BY ELIG CLIN: HCPCS | Performed by: INTERNAL MEDICINE

## 2019-01-24 PROCEDURE — G8399 PT W/DXA RESULTS DOCUMENT: HCPCS | Performed by: INTERNAL MEDICINE

## 2019-01-24 PROCEDURE — 4040F PNEUMOC VAC/ADMIN/RCVD: CPT | Performed by: INTERNAL MEDICINE

## 2019-01-24 PROCEDURE — 1101F PT FALLS ASSESS-DOCD LE1/YR: CPT | Performed by: INTERNAL MEDICINE

## 2019-01-24 PROCEDURE — G8926 SPIRO NO PERF OR DOC: HCPCS | Performed by: INTERNAL MEDICINE

## 2019-01-24 PROCEDURE — 1090F PRES/ABSN URINE INCON ASSESS: CPT | Performed by: INTERNAL MEDICINE

## 2019-01-24 RX ORDER — METOCLOPRAMIDE 10 MG/1
TABLET ORAL
Qty: 360 TABLET | Refills: 2 | Status: SHIPPED | OUTPATIENT
Start: 2019-01-24 | End: 2019-08-05 | Stop reason: DRUGHIGH

## 2019-01-24 RX ORDER — ATORVASTATIN CALCIUM 40 MG/1
40 TABLET, FILM COATED ORAL DAILY
Qty: 30 TABLET | Refills: 12 | Status: SHIPPED | OUTPATIENT
Start: 2019-01-24 | End: 2019-01-24 | Stop reason: SDUPTHER

## 2019-01-24 RX ORDER — ATORVASTATIN CALCIUM 40 MG/1
40 TABLET, FILM COATED ORAL DAILY
Qty: 90 TABLET | Refills: 3 | Status: SHIPPED | OUTPATIENT
Start: 2019-01-24 | End: 2020-01-06

## 2019-01-24 ASSESSMENT — ENCOUNTER SYMPTOMS
HOARSE VOICE: 0
SHORTNESS OF BREATH: 0
DIFFICULTY BREATHING: 0
COUGH: 1
GLOBUS SENSATION: 0
SORE THROAT: 0
BLURRED VISION: 0
BELCHING: 0
ORTHOPNEA: 0
CHEST TIGHTNESS: 0
WHEEZING: 0
FREQUENT THROAT CLEARING: 0
HEARTBURN: 0
ABDOMINAL PAIN: 0
CHOKING: 0
NAUSEA: 0
SPUTUM PRODUCTION: 1

## 2019-01-24 ASSESSMENT — COPD QUESTIONNAIRES: COPD: 1

## 2019-01-25 PROBLEM — N18.30 STAGE 3 CHRONIC KIDNEY DISEASE (HCC): Status: ACTIVE | Noted: 2019-01-25

## 2019-03-07 ENCOUNTER — OFFICE VISIT (OUTPATIENT)
Dept: SURGERY | Age: 84
End: 2019-03-07
Payer: MEDICARE

## 2019-03-07 VITALS — DIASTOLIC BLOOD PRESSURE: 64 MMHG | BODY MASS INDEX: 15.55 KG/M2 | WEIGHT: 79.6 LBS | SYSTOLIC BLOOD PRESSURE: 138 MMHG

## 2019-03-07 DIAGNOSIS — I74.5 ILIAC ARTERY OCCLUSION, LEFT (HCC): ICD-10-CM

## 2019-03-07 DIAGNOSIS — T82.898S OCCLUSION OF BYPASS GRAFT, SEQUELA: ICD-10-CM

## 2019-03-07 DIAGNOSIS — E78.5 HYPERLIPIDEMIA LDL GOAL <70: ICD-10-CM

## 2019-03-07 DIAGNOSIS — I73.9 PERIPHERAL VASCULAR DISEASE (HCC): Primary | ICD-10-CM

## 2019-03-07 PROBLEM — T82.898A OCCLUSION OF BYPASS GRAFT (HCC): Status: ACTIVE | Noted: 2019-03-07

## 2019-03-07 PROCEDURE — 1101F PT FALLS ASSESS-DOCD LE1/YR: CPT | Performed by: NURSE PRACTITIONER

## 2019-03-07 PROCEDURE — G8482 FLU IMMUNIZE ORDER/ADMIN: HCPCS | Performed by: NURSE PRACTITIONER

## 2019-03-07 PROCEDURE — G8427 DOCREV CUR MEDS BY ELIG CLIN: HCPCS | Performed by: NURSE PRACTITIONER

## 2019-03-07 PROCEDURE — 4040F PNEUMOC VAC/ADMIN/RCVD: CPT | Performed by: NURSE PRACTITIONER

## 2019-03-07 PROCEDURE — 99214 OFFICE O/P EST MOD 30 MIN: CPT | Performed by: NURSE PRACTITIONER

## 2019-03-07 PROCEDURE — 1036F TOBACCO NON-USER: CPT | Performed by: NURSE PRACTITIONER

## 2019-03-07 PROCEDURE — 1090F PRES/ABSN URINE INCON ASSESS: CPT | Performed by: NURSE PRACTITIONER

## 2019-03-07 PROCEDURE — 1123F ACP DISCUSS/DSCN MKR DOCD: CPT | Performed by: NURSE PRACTITIONER

## 2019-03-07 PROCEDURE — G8419 CALC BMI OUT NRM PARAM NOF/U: HCPCS | Performed by: NURSE PRACTITIONER

## 2019-03-07 PROCEDURE — G8598 ASA/ANTIPLAT THER USED: HCPCS | Performed by: NURSE PRACTITIONER

## 2019-03-07 PROCEDURE — G8399 PT W/DXA RESULTS DOCUMENT: HCPCS | Performed by: NURSE PRACTITIONER

## 2019-05-01 ENCOUNTER — OFFICE VISIT (OUTPATIENT)
Dept: INTERNAL MEDICINE CLINIC | Age: 84
End: 2019-05-01
Payer: MEDICARE

## 2019-05-01 VITALS
WEIGHT: 84 LBS | DIASTOLIC BLOOD PRESSURE: 84 MMHG | OXYGEN SATURATION: 95 % | HEIGHT: 60 IN | BODY MASS INDEX: 16.49 KG/M2 | SYSTOLIC BLOOD PRESSURE: 126 MMHG | HEART RATE: 81 BPM

## 2019-05-01 DIAGNOSIS — R09.89 BRUIT OF RIGHT CAROTID ARTERY: ICD-10-CM

## 2019-05-01 DIAGNOSIS — K21.9 GASTROESOPHAGEAL REFLUX DISEASE, ESOPHAGITIS PRESENCE NOT SPECIFIED: ICD-10-CM

## 2019-05-01 DIAGNOSIS — F17.200 TOBACCO USE DISORDER: ICD-10-CM

## 2019-05-01 DIAGNOSIS — I10 ESSENTIAL HYPERTENSION: Primary | ICD-10-CM

## 2019-05-01 DIAGNOSIS — N18.30 STAGE 3 CHRONIC KIDNEY DISEASE (HCC): ICD-10-CM

## 2019-05-01 DIAGNOSIS — I73.9 PERIPHERAL VASCULAR DISEASE (HCC): ICD-10-CM

## 2019-05-01 DIAGNOSIS — J41.0 SIMPLE CHRONIC BRONCHITIS (HCC): ICD-10-CM

## 2019-05-01 DIAGNOSIS — E78.2 MIXED HYPERLIPIDEMIA: ICD-10-CM

## 2019-05-01 DIAGNOSIS — D47.3 ESSENTIAL THROMBOCYTOSIS (HCC): ICD-10-CM

## 2019-05-01 LAB
A/G RATIO: 1 (ref 1.1–2.2)
ALBUMIN SERPL-MCNC: 4.1 G/DL (ref 3.4–5)
ALP BLD-CCNC: 88 U/L (ref 40–129)
ALT SERPL-CCNC: 9 U/L (ref 10–40)
ANION GAP SERPL CALCULATED.3IONS-SCNC: 13 MMOL/L (ref 3–16)
AST SERPL-CCNC: 15 U/L (ref 15–37)
BILIRUB SERPL-MCNC: 0.3 MG/DL (ref 0–1)
BUN BLDV-MCNC: 25 MG/DL (ref 7–20)
CALCIUM SERPL-MCNC: 9.8 MG/DL (ref 8.3–10.6)
CHLORIDE BLD-SCNC: 99 MMOL/L (ref 99–110)
CHOLESTEROL, TOTAL: 205 MG/DL (ref 0–199)
CO2: 27 MMOL/L (ref 21–32)
CREAT SERPL-MCNC: 1.7 MG/DL (ref 0.6–1.2)
GFR AFRICAN AMERICAN: 35
GFR NON-AFRICAN AMERICAN: 29
GLOBULIN: 4.1 G/DL
GLUCOSE BLD-MCNC: 95 MG/DL (ref 70–99)
HDLC SERPL-MCNC: 72 MG/DL (ref 40–60)
LDL CHOLESTEROL CALCULATED: 114 MG/DL
POTASSIUM SERPL-SCNC: 5.1 MMOL/L (ref 3.5–5.1)
SODIUM BLD-SCNC: 139 MMOL/L (ref 136–145)
TOTAL PROTEIN: 8.2 G/DL (ref 6.4–8.2)
TRIGL SERPL-MCNC: 95 MG/DL (ref 0–150)
VLDLC SERPL CALC-MCNC: 19 MG/DL

## 2019-05-01 PROCEDURE — G8598 ASA/ANTIPLAT THER USED: HCPCS | Performed by: INTERNAL MEDICINE

## 2019-05-01 PROCEDURE — 3023F SPIROM DOC REV: CPT | Performed by: INTERNAL MEDICINE

## 2019-05-01 PROCEDURE — 99214 OFFICE O/P EST MOD 30 MIN: CPT | Performed by: INTERNAL MEDICINE

## 2019-05-01 PROCEDURE — G8427 DOCREV CUR MEDS BY ELIG CLIN: HCPCS | Performed by: INTERNAL MEDICINE

## 2019-05-01 PROCEDURE — G8419 CALC BMI OUT NRM PARAM NOF/U: HCPCS | Performed by: INTERNAL MEDICINE

## 2019-05-01 PROCEDURE — 1123F ACP DISCUSS/DSCN MKR DOCD: CPT | Performed by: INTERNAL MEDICINE

## 2019-05-01 PROCEDURE — G8399 PT W/DXA RESULTS DOCUMENT: HCPCS | Performed by: INTERNAL MEDICINE

## 2019-05-01 PROCEDURE — 36415 COLL VENOUS BLD VENIPUNCTURE: CPT | Performed by: INTERNAL MEDICINE

## 2019-05-01 PROCEDURE — 4040F PNEUMOC VAC/ADMIN/RCVD: CPT | Performed by: INTERNAL MEDICINE

## 2019-05-01 PROCEDURE — G8926 SPIRO NO PERF OR DOC: HCPCS | Performed by: INTERNAL MEDICINE

## 2019-05-01 PROCEDURE — 1090F PRES/ABSN URINE INCON ASSESS: CPT | Performed by: INTERNAL MEDICINE

## 2019-05-01 PROCEDURE — 1036F TOBACCO NON-USER: CPT | Performed by: INTERNAL MEDICINE

## 2019-05-01 ASSESSMENT — ENCOUNTER SYMPTOMS
BLURRED VISION: 0
COUGH: 0
ORTHOPNEA: 0
HOARSE VOICE: 0
GLOBUS SENSATION: 0
NAUSEA: 0
FREQUENT THROAT CLEARING: 0
SORE THROAT: 0
HEARTBURN: 0
DIFFICULTY BREATHING: 0
CHOKING: 0
SHORTNESS OF BREATH: 0
SPUTUM PRODUCTION: 0
WHEEZING: 0
BELCHING: 0
ABDOMINAL PAIN: 0
CHEST TIGHTNESS: 0

## 2019-05-01 ASSESSMENT — COPD QUESTIONNAIRES: COPD: 1

## 2019-05-01 NOTE — PROGRESS NOTES
Subjective:    Here for recheck of htn, hld, gerd, thrombocytosis and copd  Patient ID: Amrit Teresa is a 80 y.o. female. Hypertension   This is a chronic problem. The current episode started more than 1 year ago. The problem is unchanged. The problem is controlled. Pertinent negatives include no blurred vision, chest pain, headaches, malaise/fatigue, neck pain, orthopnea, palpitations, peripheral edema, PND, shortness of breath or sweats. There are no associated agents to hypertension. Risk factors for coronary artery disease include dyslipidemia, smoking/tobacco exposure and post-menopausal state (quit smoking. ). Past treatments include diuretics. The current treatment provides significant improvement. There are no compliance problems. Hypertensive end-organ damage includes PVD. Hyperlipidemia   This is a chronic problem. The current episode started more than 1 year ago. The problem is controlled. Recent lipid tests were reviewed and are normal. Factors aggravating her hyperlipidemia include smoking. Pertinent negatives include no chest pain, focal sensory loss, focal weakness, leg pain, myalgias or shortness of breath. Current antihyperlipidemic treatment includes statins. The current treatment provides significant improvement of lipids. There are no compliance problems. Risk factors for coronary artery disease include hypertension, dyslipidemia and post-menopausal.   Gastroesophageal Reflux   She reports no abdominal pain, no belching, no chest pain, no choking, no coughing, no dysphagia, no early satiety, no globus sensation, no heartburn, no hoarse voice, no nausea, no sore throat or no wheezing. This is a chronic problem. The current episode started more than 1 year ago. The problem occurs rarely. The problem has been unchanged. Nothing aggravates the symptoms. Pertinent negatives include no anemia, fatigue, melena, muscle weakness, orthopnea or weight loss. There are no known risk factors.  She has tried a PPI for the symptoms. The treatment provided significant relief. COPD   There is no chest tightness, cough, difficulty breathing, frequent throat clearing, hoarse voice, shortness of breath, sputum production or wheezing. Primary symptoms comments: clear colored sputum in ams. At baseline. . This is a chronic problem. The current episode started more than 1 year ago. The problem occurs rarely. The problem has been unchanged (rarely using proair. ). The cough is productive of sputum (baseline). Pertinent negatives include no appetite change, chest pain, dyspnea on exertion, fever, headaches, heartburn, malaise/fatigue, myalgias, orthopnea, PND, sore throat, sweats or weight loss. Her symptoms are aggravated by nothing. Her symptoms are alleviated by beta-agonist. She reports complete improvement on treatment. Risk factors for lung disease include smoking/tobacco exposure (only smoking 2 cigs/d). Her past medical history is significant for COPD. On reglan for gerd with good results. Never reduced dose. Afraid to stop. Also has noticed intermittent difficulty speaking. No swallowing problems. Not worried about it. thrombocytosis:  Seeing heme now and on anagrelide and iron. plt count down to 500. Tolerating meds reasonably well. Tobacco abuse: quit smoking a couple months ago    PVD:  Chronic problem x yrs. Has refused surgery. Managing well. Trying to walk. Minimal problems. followed by surgery. Trouble getting up and down. Ckd:  Chronic problem. Avoiding nephrotoxic meds. Monitoring. Review of Systems   Constitutional: Negative for appetite change, fatigue, fever, malaise/fatigue, unexpected weight change and weight loss. HENT: Negative for hoarse voice and sore throat. Eyes: Negative for blurred vision. Respiratory: Negative for cough, sputum production, choking, shortness of breath and wheezing.     Cardiovascular: Negative for chest pain, dyspnea on

## 2019-05-23 ENCOUNTER — NURSE ONLY (OUTPATIENT)
Dept: INTERNAL MEDICINE CLINIC | Age: 84
End: 2019-05-23
Payer: MEDICARE

## 2019-05-23 DIAGNOSIS — I10 ESSENTIAL HYPERTENSION: Primary | ICD-10-CM

## 2019-05-23 DIAGNOSIS — N18.30 STAGE 3 CHRONIC KIDNEY DISEASE (HCC): ICD-10-CM

## 2019-05-23 LAB
A/G RATIO: 1 (ref 1.1–2.2)
ALBUMIN SERPL-MCNC: 3.9 G/DL (ref 3.4–5)
ALP BLD-CCNC: 77 U/L (ref 40–129)
ALT SERPL-CCNC: 6 U/L (ref 10–40)
ANION GAP SERPL CALCULATED.3IONS-SCNC: 11 MMOL/L (ref 3–16)
AST SERPL-CCNC: 13 U/L (ref 15–37)
BILIRUB SERPL-MCNC: 0.3 MG/DL (ref 0–1)
BUN BLDV-MCNC: 26 MG/DL (ref 7–20)
CALCIUM SERPL-MCNC: 9.3 MG/DL (ref 8.3–10.6)
CHLORIDE BLD-SCNC: 100 MMOL/L (ref 99–110)
CO2: 25 MMOL/L (ref 21–32)
CREAT SERPL-MCNC: 1.1 MG/DL (ref 0.6–1.2)
GFR AFRICAN AMERICAN: 57
GFR NON-AFRICAN AMERICAN: 47
GLOBULIN: 3.8 G/DL
GLUCOSE BLD-MCNC: 94 MG/DL (ref 70–99)
POTASSIUM SERPL-SCNC: 4.3 MMOL/L (ref 3.5–5.1)
SODIUM BLD-SCNC: 136 MMOL/L (ref 136–145)
TOTAL PROTEIN: 7.7 G/DL (ref 6.4–8.2)

## 2019-05-23 PROCEDURE — 36415 COLL VENOUS BLD VENIPUNCTURE: CPT | Performed by: INTERNAL MEDICINE

## 2019-07-17 RX ORDER — FUROSEMIDE 20 MG/1
TABLET ORAL
Qty: 90 TABLET | Refills: 3 | Status: ON HOLD | OUTPATIENT
Start: 2019-07-17 | End: 2020-04-05 | Stop reason: SDUPTHER

## 2019-08-05 ENCOUNTER — OFFICE VISIT (OUTPATIENT)
Dept: INTERNAL MEDICINE CLINIC | Age: 84
End: 2019-08-05
Payer: MEDICARE

## 2019-08-05 VITALS
DIASTOLIC BLOOD PRESSURE: 74 MMHG | SYSTOLIC BLOOD PRESSURE: 122 MMHG | BODY MASS INDEX: 17.94 KG/M2 | HEART RATE: 88 BPM | HEIGHT: 60 IN | WEIGHT: 91.4 LBS | RESPIRATION RATE: 16 BRPM | OXYGEN SATURATION: 93 %

## 2019-08-05 DIAGNOSIS — I73.9 PERIPHERAL VASCULAR DISEASE (HCC): ICD-10-CM

## 2019-08-05 DIAGNOSIS — J41.0 SIMPLE CHRONIC BRONCHITIS (HCC): ICD-10-CM

## 2019-08-05 DIAGNOSIS — R09.89 BRUIT OF RIGHT CAROTID ARTERY: ICD-10-CM

## 2019-08-05 DIAGNOSIS — E78.2 MIXED HYPERLIPIDEMIA: ICD-10-CM

## 2019-08-05 DIAGNOSIS — I10 ESSENTIAL HYPERTENSION: Primary | ICD-10-CM

## 2019-08-05 DIAGNOSIS — N18.30 STAGE 3 CHRONIC KIDNEY DISEASE (HCC): ICD-10-CM

## 2019-08-05 DIAGNOSIS — D47.3 ESSENTIAL THROMBOCYTOSIS (HCC): ICD-10-CM

## 2019-08-05 DIAGNOSIS — F17.200 TOBACCO USE DISORDER: ICD-10-CM

## 2019-08-05 DIAGNOSIS — K21.9 GASTROESOPHAGEAL REFLUX DISEASE, ESOPHAGITIS PRESENCE NOT SPECIFIED: ICD-10-CM

## 2019-08-05 PROCEDURE — 1036F TOBACCO NON-USER: CPT | Performed by: INTERNAL MEDICINE

## 2019-08-05 PROCEDURE — 99213 OFFICE O/P EST LOW 20 MIN: CPT | Performed by: INTERNAL MEDICINE

## 2019-08-05 PROCEDURE — 3023F SPIROM DOC REV: CPT | Performed by: INTERNAL MEDICINE

## 2019-08-05 PROCEDURE — G8399 PT W/DXA RESULTS DOCUMENT: HCPCS | Performed by: INTERNAL MEDICINE

## 2019-08-05 PROCEDURE — 1090F PRES/ABSN URINE INCON ASSESS: CPT | Performed by: INTERNAL MEDICINE

## 2019-08-05 PROCEDURE — 1123F ACP DISCUSS/DSCN MKR DOCD: CPT | Performed by: INTERNAL MEDICINE

## 2019-08-05 PROCEDURE — G8598 ASA/ANTIPLAT THER USED: HCPCS | Performed by: INTERNAL MEDICINE

## 2019-08-05 PROCEDURE — G8419 CALC BMI OUT NRM PARAM NOF/U: HCPCS | Performed by: INTERNAL MEDICINE

## 2019-08-05 PROCEDURE — G8427 DOCREV CUR MEDS BY ELIG CLIN: HCPCS | Performed by: INTERNAL MEDICINE

## 2019-08-05 PROCEDURE — 4040F PNEUMOC VAC/ADMIN/RCVD: CPT | Performed by: INTERNAL MEDICINE

## 2019-08-05 PROCEDURE — G8926 SPIRO NO PERF OR DOC: HCPCS | Performed by: INTERNAL MEDICINE

## 2019-08-05 RX ORDER — METOCLOPRAMIDE 10 MG/1
TABLET ORAL
Qty: 60 TABLET | Refills: 5 | Status: SHIPPED | OUTPATIENT
Start: 2019-08-05 | End: 2020-01-30

## 2019-08-05 ASSESSMENT — ENCOUNTER SYMPTOMS
SPUTUM PRODUCTION: 0
BELCHING: 0
ORTHOPNEA: 0
COUGH: 0
HOARSE VOICE: 0
CHEST TIGHTNESS: 0
HEARTBURN: 0
GLOBUS SENSATION: 0
DIFFICULTY BREATHING: 0
NAUSEA: 0
BLURRED VISION: 0
ABDOMINAL PAIN: 0
SORE THROAT: 0
FREQUENT THROAT CLEARING: 0
CHOKING: 0
SHORTNESS OF BREATH: 0
WHEEZING: 0

## 2019-08-05 ASSESSMENT — PATIENT HEALTH QUESTIONNAIRE - PHQ9
SUM OF ALL RESPONSES TO PHQ9 QUESTIONS 1 & 2: 0
SUM OF ALL RESPONSES TO PHQ QUESTIONS 1-9: 0
2. FEELING DOWN, DEPRESSED OR HOPELESS: 0
SUM OF ALL RESPONSES TO PHQ QUESTIONS 1-9: 0
1. LITTLE INTEREST OR PLEASURE IN DOING THINGS: 0

## 2019-08-05 ASSESSMENT — COPD QUESTIONNAIRES: COPD: 1

## 2019-08-08 DIAGNOSIS — K21.9 GASTROESOPHAGEAL REFLUX DISEASE, ESOPHAGITIS PRESENCE NOT SPECIFIED: ICD-10-CM

## 2019-08-08 RX ORDER — PANTOPRAZOLE SODIUM 40 MG/1
40 TABLET, DELAYED RELEASE ORAL DAILY
Qty: 90 TABLET | Refills: 3 | Status: SHIPPED | OUTPATIENT
Start: 2019-08-08 | End: 2019-08-29

## 2020-01-06 RX ORDER — ATORVASTATIN CALCIUM 40 MG/1
40 TABLET, FILM COATED ORAL DAILY
Qty: 90 TABLET | Refills: 3 | Status: SHIPPED | OUTPATIENT
Start: 2020-01-06 | End: 2021-01-25

## 2020-01-06 NOTE — TELEPHONE ENCOUNTER
Last office visit;08/05/2019    Future Appointments   Date Time Provider Vira Cummins   2/12/2020  1:00 PM Fredi Babb MD [facility]

## 2020-02-12 ENCOUNTER — OFFICE VISIT (OUTPATIENT)
Dept: INTERNAL MEDICINE CLINIC | Age: 85
End: 2020-02-12
Payer: MEDICARE

## 2020-02-12 VITALS
OXYGEN SATURATION: 98 % | BODY MASS INDEX: 17.08 KG/M2 | WEIGHT: 87 LBS | SYSTOLIC BLOOD PRESSURE: 154 MMHG | RESPIRATION RATE: 16 BRPM | DIASTOLIC BLOOD PRESSURE: 82 MMHG | HEART RATE: 109 BPM | HEIGHT: 60 IN

## 2020-02-12 LAB
A/G RATIO: 1.1 (ref 1.1–2.2)
ALBUMIN SERPL-MCNC: 4.1 G/DL (ref 3.4–5)
ALP BLD-CCNC: 97 U/L (ref 40–129)
ALT SERPL-CCNC: 7 U/L (ref 10–40)
ANION GAP SERPL CALCULATED.3IONS-SCNC: 15 MMOL/L (ref 3–16)
AST SERPL-CCNC: 13 U/L (ref 15–37)
BILIRUB SERPL-MCNC: 0.3 MG/DL (ref 0–1)
BUN BLDV-MCNC: 16 MG/DL (ref 7–20)
CALCIUM SERPL-MCNC: 9.5 MG/DL (ref 8.3–10.6)
CHLORIDE BLD-SCNC: 99 MMOL/L (ref 99–110)
CO2: 25 MMOL/L (ref 21–32)
CREAT SERPL-MCNC: 1 MG/DL (ref 0.6–1.2)
GFR AFRICAN AMERICAN: >60
GFR NON-AFRICAN AMERICAN: 53
GLOBULIN: 3.6 G/DL
GLUCOSE BLD-MCNC: 73 MG/DL (ref 70–99)
LDL CHOLESTEROL DIRECT: 88 MG/DL
POTASSIUM SERPL-SCNC: 4.5 MMOL/L (ref 3.5–5.1)
SODIUM BLD-SCNC: 139 MMOL/L (ref 136–145)
TOTAL PROTEIN: 7.7 G/DL (ref 6.4–8.2)
TSH REFLEX: 1.23 UIU/ML (ref 0.27–4.2)

## 2020-02-12 PROCEDURE — 1090F PRES/ABSN URINE INCON ASSESS: CPT | Performed by: INTERNAL MEDICINE

## 2020-02-12 PROCEDURE — G8399 PT W/DXA RESULTS DOCUMENT: HCPCS | Performed by: INTERNAL MEDICINE

## 2020-02-12 PROCEDURE — G8419 CALC BMI OUT NRM PARAM NOF/U: HCPCS | Performed by: INTERNAL MEDICINE

## 2020-02-12 PROCEDURE — 99214 OFFICE O/P EST MOD 30 MIN: CPT | Performed by: INTERNAL MEDICINE

## 2020-02-12 PROCEDURE — 36415 COLL VENOUS BLD VENIPUNCTURE: CPT | Performed by: INTERNAL MEDICINE

## 2020-02-12 PROCEDURE — 1123F ACP DISCUSS/DSCN MKR DOCD: CPT | Performed by: INTERNAL MEDICINE

## 2020-02-12 PROCEDURE — 3023F SPIROM DOC REV: CPT | Performed by: INTERNAL MEDICINE

## 2020-02-12 PROCEDURE — 4040F PNEUMOC VAC/ADMIN/RCVD: CPT | Performed by: INTERNAL MEDICINE

## 2020-02-12 PROCEDURE — G8484 FLU IMMUNIZE NO ADMIN: HCPCS | Performed by: INTERNAL MEDICINE

## 2020-02-12 PROCEDURE — 1036F TOBACCO NON-USER: CPT | Performed by: INTERNAL MEDICINE

## 2020-02-12 PROCEDURE — G8427 DOCREV CUR MEDS BY ELIG CLIN: HCPCS | Performed by: INTERNAL MEDICINE

## 2020-02-12 PROCEDURE — G8926 SPIRO NO PERF OR DOC: HCPCS | Performed by: INTERNAL MEDICINE

## 2020-02-12 RX ORDER — METOCLOPRAMIDE 10 MG/1
TABLET ORAL
Qty: 360 TABLET | Refills: 3 | Status: SHIPPED
Start: 2020-02-12 | End: 2020-05-11

## 2020-02-12 ASSESSMENT — ENCOUNTER SYMPTOMS
HEARTBURN: 0
BLURRED VISION: 0
HOARSE VOICE: 0
ORTHOPNEA: 0
COUGH: 0
CHOKING: 0
SORE THROAT: 0
SHORTNESS OF BREATH: 0
CHEST TIGHTNESS: 0
FREQUENT THROAT CLEARING: 0
ABDOMINAL PAIN: 0
DIFFICULTY BREATHING: 0
NAUSEA: 0
WHEEZING: 0
BELCHING: 0
SPUTUM PRODUCTION: 0
GLOBUS SENSATION: 0

## 2020-02-12 ASSESSMENT — COPD QUESTIONNAIRES: COPD: 1

## 2020-02-12 NOTE — PROGRESS NOTES
Subjective:    Here for recheck of htn, hld, gerd, thrombocytosis and copd  Patient ID: Jeanine Evans is a 80 y.o. female. Hypertension   This is a chronic problem. The current episode started more than 1 year ago. The problem is unchanged. The problem is controlled. Pertinent negatives include no blurred vision, chest pain, headaches, malaise/fatigue, neck pain, orthopnea, palpitations, peripheral edema, PND, shortness of breath or sweats. There are no associated agents to hypertension. Risk factors for coronary artery disease include dyslipidemia, smoking/tobacco exposure and post-menopausal state (quit smoking. ). Past treatments include diuretics. The current treatment provides significant improvement. There are no compliance problems. Hypertensive end-organ damage includes PVD. Hyperlipidemia   This is a chronic problem. The current episode started more than 1 year ago. The problem is controlled. Recent lipid tests were reviewed and are normal. Factors aggravating her hyperlipidemia include smoking. Pertinent negatives include no chest pain, focal sensory loss, focal weakness, leg pain, myalgias or shortness of breath. Current antihyperlipidemic treatment includes statins. The current treatment provides significant improvement of lipids. There are no compliance problems. Risk factors for coronary artery disease include hypertension, dyslipidemia and post-menopausal.   Gastroesophageal Reflux   She reports no abdominal pain, no belching, no chest pain, no choking, no coughing, no dysphagia, no early satiety, no globus sensation, no heartburn, no hoarse voice, no nausea, no sore throat or no wheezing. This is a chronic problem. The current episode started more than 1 year ago. The problem occurs rarely. The problem has been unchanged. Nothing aggravates the symptoms. Pertinent negatives include no anemia, fatigue, melena, muscle weakness, orthopnea or weight loss. There are no known risk factors.  She Constitutional: She is oriented to person, place, and time. She appears well-developed and well-nourished. No distress. Neck: No JVD present.   + right bruit. Cardiovascular: Normal rate, S1 normal, S2 normal and normal heart sounds. A regularly irregular rhythm present. Exam reveals no gallop and no friction rub. No murmur heard. Pulmonary/Chest: Effort normal and breath sounds normal. No respiratory distress. She has no wheezes. She has no rales. Musculoskeletal:         General: No edema. Neurological: She is alert and oriented to person, place, and time. Skin: Skin is warm and dry. She is not diaphoretic. No erythema. Psychiatric: She has a normal mood and affect. Her behavior is normal. Judgment and thought content normal.   Vitals reviewed. Assessment:      1. Essential hypertension    2. Gastroesophageal reflux disease, esophagitis presence not specified    3. Hyperlipidemia LDL goal <70    4. Simple chronic bronchitis (HCC)    5. Stage 3 chronic kidney disease ([facility]    6. Essential thrombocytosis (HCC)    7. Bruit of right carotid artery    8. Peripheral vascular disease ([facility]             Plan:      Maggie Carpio was seen today for hypertension. Diagnoses and all orders for this visit:    Essential hypertension:  Stable, cont same meds  -     Comprehensive Metabolic Panel; Future  -     TSH with Reflex; Future    Gastroesophageal reflux disease, esophagitis presence not specified:  Stable, cont same meds  -     Comprehensive Metabolic Panel; Future    Hyperlipidemia LDL goal <70:  Stable, cont same meds  -     Comprehensive Metabolic Panel; Future  -     LDL Cholesterol, Direct; Future    Simple chronic bronchitis ([facility]):  Stable, cont same meds    Stage 3 chronic kidney disease [facility]):  monitor  -     Comprehensive Metabolic Panel; Future    Essential thrombocytosis ([facility]):  Per heme    Bruit of right carotid artery:  Refusing doppler.   Check labs  -     Comprehensive Metabolic Panel; Future  -     LDL Cholesterol, Direct; Future    Peripheral vascular disease ([facility]):  Urge exercise,  Cont same meds  -     Comprehensive Metabolic Panel; Future  -     LDL Cholesterol, Direct; Future          6  Months per pt request.  Refusing carotid dopplers, ct of head.

## 2020-02-26 ENCOUNTER — TELEPHONE (OUTPATIENT)
Dept: INTERNAL MEDICINE CLINIC | Age: 85
End: 2020-02-26

## 2020-02-26 NOTE — TELEPHONE ENCOUNTER
Patient son says patient is taking a water pill that has her going to the restroom a lot. Would like to know if they could cut it or move it down.  Please advise

## 2020-03-24 ENCOUNTER — TELEPHONE (OUTPATIENT)
Dept: INTERNAL MEDICINE CLINIC | Age: 85
End: 2020-03-24

## 2020-03-24 RX ORDER — ALBUTEROL SULFATE 90 UG/1
AEROSOL, METERED RESPIRATORY (INHALATION)
Qty: 8.5 G | Refills: 5 | Status: SHIPPED | OUTPATIENT
Start: 2020-03-24 | End: 2020-03-25

## 2020-03-24 NOTE — TELEPHONE ENCOUNTER
Is she asking for a rf of proair? Ok # 1 x 5 rf  This is something she has had in the past.  Or is she asking for a different inhaler?

## 2020-03-24 NOTE — TELEPHONE ENCOUNTER
Spoke with Edmond Ortiz clarified that is needing proair inhaler. Refill sent to [facility].

## 2020-03-25 RX ORDER — ALBUTEROL SULFATE 90 UG/1
AEROSOL, METERED RESPIRATORY (INHALATION)
Qty: 25.5 G | Refills: 3 | Status: SHIPPED | OUTPATIENT
Start: 2020-03-25 | End: 2021-01-04 | Stop reason: ALTCHOICE

## 2020-03-25 NOTE — TELEPHONE ENCOUNTER
Last office visit:02/12/2020        Future Appointments   Date Time Provider Vira Cummins   8/12/2020  1:00 PM Phillip Cartagena MD [facility]

## 2020-03-30 ENCOUNTER — VIRTUAL VISIT (OUTPATIENT)
Dept: INTERNAL MEDICINE CLINIC | Age: 85
End: 2020-03-30
Payer: MEDICARE

## 2020-03-30 PROCEDURE — G2012 BRIEF CHECK IN BY MD/QHP: HCPCS | Performed by: INTERNAL MEDICINE

## 2020-03-30 ASSESSMENT — ENCOUNTER SYMPTOMS
CHEST TIGHTNESS: 0
SHORTNESS OF BREATH: 1
COUGH: 0

## 2020-03-30 NOTE — PROGRESS NOTES
Subjective:    cc: anxiety and panic attacks  Patient ID: Iram Merritt is a 80 y.o. female. HPI   Talked with son and pt. Son states that pt gets anxious when she lays down to go to sleep at night. She has severe copd and does get sob walking to her bedroom. Sits on the side of the bed to catch her breath. When she tries to lay down becomes acutely sob and wants to sit back up. Goes on all night. Denies cp, pedal edema, nocturia. Had previously been taking lasix 40 qd. But at last visit reduced to 1/2 daily due to frequent urination. Not propping self on pillows. Review of Systems   Constitutional: Negative for fatigue. Respiratory: Positive for shortness of breath. Negative for cough and chest tightness. Cardiovascular: Negative for chest pain, palpitations and leg swelling. Neurological: Negative for syncope and light-headedness. Psychiatric/Behavioral: Positive for confusion. The patient is nervous/anxious. Prior to Visit Medications    Medication Sig Taking?  Authorizing Provider   albuterol sulfate  (90 Base) MCG/ACT inhaler INHALE 2 PUFFS BY MOUTH FOUR TIMES DAILY AS NEEDED FOR SHORTNESS OF BREATH  Erik Melara MD   FEROSUL 325 (65 Fe) MG tablet TAKE 1 TABLET BY MOUTH DAILY  FATOUMATA Aceves CNP   metoclopramide (REGLAN) 10 MG tablet TAKE 1 TABLET BY MOUTH at breakfast and dinner daily  Erik Melara MD   atorvastatin (LIPITOR) 40 MG tablet TAKE 1 TABLET BY MOUTH DAILY  QUINTIN Barker MD   pantoprazole (PROTONIX) 40 MG tablet TAKE 1 TABLET BY MOUTH DAILY  FATOUMATA Aceves CNP   furosemide (LASIX) 20 MG tablet TAKE 1 TABLET BY MOUTH DAILY  QUINTIN Barker MD   alendronate ([address]) 70 MG tablet TAKE 1 TABLET BY MOUTH EVERY 7 DAYS  QUINTIN Barker MD   anagrelide (AGRYLIN) 0.5 MG capsule TAKE ONE CAPSULE BY MOUTH ON MONDAY, [address] [facility], AND FRIDAY AND 2 CAPSULES ON TUESDAY, [address] [facility], [address] [facility], [address] [facility]

## 2020-03-31 ENCOUNTER — TELEPHONE (OUTPATIENT)
Dept: INTERNAL MEDICINE CLINIC | Age: 85
End: 2020-03-31

## 2020-04-01 ENCOUNTER — HOSPITAL ENCOUNTER (INPATIENT)
Age: 85
LOS: 4 days | Discharge: HOME | DRG: 291 | End: 2020-04-05
Attending: STUDENT IN AN ORGANIZED HEALTH CARE EDUCATION/TRAINING PROGRAM | Admitting: INTERNAL MEDICINE
Payer: MEDICARE

## 2020-04-01 ENCOUNTER — APPOINTMENT (OUTPATIENT)
Dept: GENERAL RADIOLOGY | Age: 85
DRG: 291 | End: 2020-04-01
Payer: MEDICARE

## 2020-04-01 PROBLEM — R06.02 SHORTNESS OF BREATH: Status: ACTIVE | Noted: 2020-04-01

## 2020-04-01 LAB
ALBUMIN SERPL-MCNC: 3.2 G/DL (ref 3.4–5)
ALP BLD-CCNC: 127 U/L (ref 40–129)
ALT SERPL-CCNC: 39 U/L (ref 10–40)
ANION GAP SERPL CALCULATED.3IONS-SCNC: 16 MMOL/L (ref 3–16)
ANISOCYTOSIS: ABNORMAL
AST SERPL-CCNC: 39 U/L (ref 15–37)
BACTERIA: ABNORMAL /HPF
BASOPHILS ABSOLUTE: 0.1 K/UL (ref 0–0.2)
BASOPHILS RELATIVE PERCENT: 0.8 %
BILIRUB SERPL-MCNC: 0.5 MG/DL (ref 0–1)
BILIRUBIN DIRECT: <0.2 MG/DL (ref 0–0.3)
BILIRUBIN URINE: NEGATIVE
BILIRUBIN, INDIRECT: ABNORMAL MG/DL (ref 0–1)
BLOOD, URINE: NEGATIVE
BUN BLDV-MCNC: 28 MG/DL (ref 7–20)
BURR CELLS: ABNORMAL
CALCIUM SERPL-MCNC: 8.6 MG/DL (ref 8.3–10.6)
CHLORIDE BLD-SCNC: 108 MMOL/L (ref 99–110)
CLARITY: ABNORMAL
CO2: 22 MMOL/L (ref 21–32)
COLOR: YELLOW
CREAT SERPL-MCNC: 1.2 MG/DL (ref 0.6–1.2)
EOSINOPHILS ABSOLUTE: 0.1 K/UL (ref 0–0.6)
EOSINOPHILS RELATIVE PERCENT: 0.9 %
EPITHELIAL CELLS, UA: 0 /HPF (ref 0–5)
GFR AFRICAN AMERICAN: 52
GFR NON-AFRICAN AMERICAN: 43
GLUCOSE BLD-MCNC: 108 MG/DL (ref 70–99)
GLUCOSE URINE: NEGATIVE MG/DL
HCT VFR BLD CALC: 35.6 % (ref 36–48)
HEMOGLOBIN: 11.2 G/DL (ref 12–16)
HYALINE CASTS: 4 /LPF (ref 0–8)
KETONES, URINE: NEGATIVE MG/DL
LEUKOCYTE ESTERASE, URINE: ABNORMAL
LYMPHOCYTES ABSOLUTE: 1 K/UL (ref 1–5.1)
LYMPHOCYTES RELATIVE PERCENT: 8.8 %
MCH RBC QN AUTO: 24.8 PG (ref 26–34)
MCHC RBC AUTO-ENTMCNC: 31.4 G/DL (ref 31–36)
MCV RBC AUTO: 79 FL (ref 80–100)
MICROSCOPIC EXAMINATION: YES
MONOCYTES ABSOLUTE: 0.8 K/UL (ref 0–1.3)
MONOCYTES RELATIVE PERCENT: 6.8 %
NEUTROPHILS ABSOLUTE: 9.2 K/UL (ref 1.7–7.7)
NEUTROPHILS RELATIVE PERCENT: 82.7 %
NITRITE, URINE: NEGATIVE
OVALOCYTES: ABNORMAL
PDW BLD-RTO: 22.3 % (ref 12.4–15.4)
PH UA: 5 (ref 5–8)
PLATELET # BLD: 340 K/UL (ref 135–450)
PLATELET SLIDE REVIEW: ADEQUATE
PMV BLD AUTO: 9.6 FL (ref 5–10.5)
POLYCHROMASIA: ABNORMAL
POTASSIUM REFLEX MAGNESIUM: 4.1 MMOL/L (ref 3.5–5.1)
PRO-BNP: ABNORMAL PG/ML (ref 0–449)
PROCALCITONIN: 0.08 NG/ML (ref 0–0.15)
PROTEIN UA: NEGATIVE MG/DL
RAPID INFLUENZA  B AGN: NEGATIVE
RAPID INFLUENZA A AGN: NEGATIVE
RBC # BLD: 4.51 M/UL (ref 4–5.2)
RBC UA: 1 /HPF (ref 0–4)
SCHISTOCYTES: ABNORMAL
SODIUM BLD-SCNC: 146 MMOL/L (ref 136–145)
SPECIFIC GRAVITY UA: 1.01 (ref 1–1.03)
TOTAL PROTEIN: 6.7 G/DL (ref 6.4–8.2)
TROPONIN: 0.02 NG/ML
TROPONIN: 0.03 NG/ML
TSH SERPL DL<=0.05 MIU/L-ACNC: 1.16 UIU/ML (ref 0.27–4.2)
URINE REFLEX TO CULTURE: YES
URINE TYPE: ABNORMAL
UROBILINOGEN, URINE: 0.2 E.U./DL
WBC # BLD: 11.1 K/UL (ref 4–11)
WBC UA: 49 /HPF (ref 0–5)

## 2020-04-01 PROCEDURE — 2700000000 HC OXYGEN THERAPY PER DAY

## 2020-04-01 PROCEDURE — 84443 ASSAY THYROID STIM HORMONE: CPT

## 2020-04-01 PROCEDURE — 99285 EMERGENCY DEPT VISIT HI MDM: CPT

## 2020-04-01 PROCEDURE — 80048 BASIC METABOLIC PNL TOTAL CA: CPT

## 2020-04-01 PROCEDURE — 6360000002 HC RX W HCPCS: Performed by: INTERNAL MEDICINE

## 2020-04-01 PROCEDURE — 1200000000 HC SEMI PRIVATE

## 2020-04-01 PROCEDURE — 80076 HEPATIC FUNCTION PANEL: CPT

## 2020-04-01 PROCEDURE — 36415 COLL VENOUS BLD VENIPUNCTURE: CPT

## 2020-04-01 PROCEDURE — 84484 ASSAY OF TROPONIN QUANT: CPT

## 2020-04-01 PROCEDURE — 87186 SC STD MICRODIL/AGAR DIL: CPT

## 2020-04-01 PROCEDURE — 87077 CULTURE AEROBIC IDENTIFY: CPT

## 2020-04-01 PROCEDURE — 87804 INFLUENZA ASSAY W/OPTIC: CPT

## 2020-04-01 PROCEDURE — 87086 URINE CULTURE/COLONY COUNT: CPT

## 2020-04-01 PROCEDURE — 93005 ELECTROCARDIOGRAM TRACING: CPT | Performed by: STUDENT IN AN ORGANIZED HEALTH CARE EDUCATION/TRAINING PROGRAM

## 2020-04-01 PROCEDURE — 85025 COMPLETE CBC W/AUTO DIFF WBC: CPT

## 2020-04-01 PROCEDURE — 83880 ASSAY OF NATRIURETIC PEPTIDE: CPT

## 2020-04-01 PROCEDURE — 71045 X-RAY EXAM CHEST 1 VIEW: CPT

## 2020-04-01 PROCEDURE — 2580000003 HC RX 258: Performed by: INTERNAL MEDICINE

## 2020-04-01 PROCEDURE — 94150 VITAL CAPACITY TEST: CPT

## 2020-04-01 PROCEDURE — 84145 PROCALCITONIN (PCT): CPT

## 2020-04-01 PROCEDURE — 81001 URINALYSIS AUTO W/SCOPE: CPT

## 2020-04-01 PROCEDURE — 94760 N-INVAS EAR/PLS OXIMETRY 1: CPT

## 2020-04-01 RX ORDER — ONDANSETRON 2 MG/ML
4 INJECTION INTRAMUSCULAR; INTRAVENOUS EVERY 6 HOURS PRN
Status: DISCONTINUED | OUTPATIENT
Start: 2020-04-01 | End: 2020-04-05 | Stop reason: HOSPADM

## 2020-04-01 RX ORDER — POLYETHYLENE GLYCOL 3350 17 G/17G
17 POWDER, FOR SOLUTION ORAL DAILY PRN
Status: DISCONTINUED | OUTPATIENT
Start: 2020-04-01 | End: 2020-04-05 | Stop reason: HOSPADM

## 2020-04-01 RX ORDER — ANAGRELIDE 0.5 MG/1
1 CAPSULE ORAL
Status: DISCONTINUED | OUTPATIENT
Start: 2020-04-02 | End: 2020-04-05 | Stop reason: HOSPADM

## 2020-04-01 RX ORDER — SODIUM CHLORIDE 0.9 % (FLUSH) 0.9 %
10 SYRINGE (ML) INJECTION PRN
Status: DISCONTINUED | OUTPATIENT
Start: 2020-04-01 | End: 2020-04-05 | Stop reason: HOSPADM

## 2020-04-01 RX ORDER — ATORVASTATIN CALCIUM 40 MG/1
40 TABLET, FILM COATED ORAL DAILY
Status: DISCONTINUED | OUTPATIENT
Start: 2020-04-02 | End: 2020-04-05 | Stop reason: HOSPADM

## 2020-04-01 RX ORDER — FUROSEMIDE 10 MG/ML
40 INJECTION INTRAMUSCULAR; INTRAVENOUS DAILY
Status: DISCONTINUED | OUTPATIENT
Start: 2020-04-01 | End: 2020-04-02

## 2020-04-01 RX ORDER — ACETAMINOPHEN 650 MG/1
650 SUPPOSITORY RECTAL EVERY 6 HOURS PRN
Status: DISCONTINUED | OUTPATIENT
Start: 2020-04-01 | End: 2020-04-05 | Stop reason: HOSPADM

## 2020-04-01 RX ORDER — ACETAMINOPHEN 325 MG/1
650 TABLET ORAL EVERY 6 HOURS PRN
Status: DISCONTINUED | OUTPATIENT
Start: 2020-04-01 | End: 2020-04-05 | Stop reason: HOSPADM

## 2020-04-01 RX ORDER — ALBUTEROL SULFATE 2.5 MG/3ML
2.5 SOLUTION RESPIRATORY (INHALATION) EVERY 6 HOURS PRN
Status: DISCONTINUED | OUTPATIENT
Start: 2020-04-01 | End: 2020-04-05 | Stop reason: HOSPADM

## 2020-04-01 RX ORDER — ASPIRIN 81 MG/1
162 TABLET, CHEWABLE ORAL DAILY
Status: DISCONTINUED | OUTPATIENT
Start: 2020-04-02 | End: 2020-04-05 | Stop reason: HOSPADM

## 2020-04-01 RX ORDER — ALBUTEROL SULFATE 90 UG/1
2 AEROSOL, METERED RESPIRATORY (INHALATION) EVERY 6 HOURS PRN
Status: DISCONTINUED | OUTPATIENT
Start: 2020-04-01 | End: 2020-04-01

## 2020-04-01 RX ORDER — METOCLOPRAMIDE 10 MG/1
10 TABLET ORAL 2 TIMES DAILY
Status: DISCONTINUED | OUTPATIENT
Start: 2020-04-02 | End: 2020-04-05 | Stop reason: HOSPADM

## 2020-04-01 RX ORDER — ANAGRELIDE 0.5 MG/1
0.5 CAPSULE ORAL
Status: DISCONTINUED | OUTPATIENT
Start: 2020-04-03 | End: 2020-04-05 | Stop reason: HOSPADM

## 2020-04-01 RX ORDER — SODIUM CHLORIDE 0.9 % (FLUSH) 0.9 %
10 SYRINGE (ML) INJECTION EVERY 12 HOURS SCHEDULED
Status: DISCONTINUED | OUTPATIENT
Start: 2020-04-01 | End: 2020-04-05 | Stop reason: HOSPADM

## 2020-04-01 RX ORDER — PROMETHAZINE HYDROCHLORIDE 25 MG/1
12.5 TABLET ORAL EVERY 6 HOURS PRN
Status: DISCONTINUED | OUTPATIENT
Start: 2020-04-01 | End: 2020-04-05 | Stop reason: HOSPADM

## 2020-04-01 RX ORDER — PANTOPRAZOLE SODIUM 40 MG/1
40 TABLET, DELAYED RELEASE ORAL DAILY
Status: DISCONTINUED | OUTPATIENT
Start: 2020-04-02 | End: 2020-04-05 | Stop reason: HOSPADM

## 2020-04-01 RX ORDER — ANAGRELIDE 0.5 MG/1
0.5 CAPSULE ORAL DAILY
Status: DISCONTINUED | OUTPATIENT
Start: 2020-04-01 | End: 2020-04-01

## 2020-04-01 RX ADMIN — FUROSEMIDE 40 MG: 10 INJECTION, SOLUTION INTRAMUSCULAR; INTRAVENOUS at 18:14

## 2020-04-01 RX ADMIN — SODIUM CHLORIDE, PRESERVATIVE FREE 10 ML: 5 INJECTION INTRAVENOUS at 21:00

## 2020-04-01 RX ADMIN — CEFTRIAXONE 1 G: 1 INJECTION, POWDER, FOR SOLUTION INTRAMUSCULAR; INTRAVENOUS at 21:58

## 2020-04-01 NOTE — ED PROVIDER NOTES
Transportation needs     Medical: Not on file     Non-medical: Not on file   Tobacco Use    Smoking status: Former Smoker     Types: Cigarettes     Last attempt to quit: 2019     Years since quittin.1    Smokeless tobacco: Never Used   Substance and Sexual Activity    Alcohol use: No    Drug use: No    Sexual activity: Not on file   Lifestyle    Physical activity     Days per week: Not on file     Minutes per session: Not on file    Stress: Not on file   Relationships    Social connections     Talks on phone: Not on file     Gets together: Not on file     Attends Denominational service: Not on file     Active member of club or organization: Not on file     Attends meetings of clubs or organizations: Not on file     Relationship status: Not on file    Intimate partner violence     Fear of current or ex partner: Not on file     Emotionally abused: Not on file     Physically abused: Not on file     Forced sexual activity: Not on file   Other Topics Concern    Not on file   Social History Narrative    Not on file        Review of Systems   10 total systems reviewed and found to be negative unless otherwise noted in HPI     Physical Exam   BP (!) 124/96   Pulse 98   Temp 97.7 °F (36.5 °C) (Oral)   Resp 17   Ht 5' (1.524 m)   BMI 16.99 kg/m²      CONSTITUTIONAL: Frail and elderly, in no acute distress   HEAD: atraumatic, normocephalic   EYES: PERRL, No injection, discharge or scleral icterus. ENT: Moist mucous membranes. NECK: Normal ROM, NO LAD   CARDIOVASCULAR: Regular rate and rhythm. No murmurs or gallop. PULMONARY/CHEST: Airway patent. No retractions. Breath sounds clear with good air entry bilaterally. ABDOMEN: Soft, Non-distended and non-tender, without guarding or rebound. SKIN: Acyanotic, warm, dry   MUSCULOSKELETAL: No swelling, tenderness or deformity   NEUROLOGICAL: Awake and oriented x 3. Pulses intact. Grossly nonfocal   Nursing note and vitals reviewed.      ED Course & Medical

## 2020-04-01 NOTE — H&P
lasix dose from 40 mg to 20 mg daily due to frequent urination. PCP increased back to 40 mg daily with plans to follow up by phone. Labs in the ED remarkable for Na 146, BNP 30K, trop 0.02, WBC 11.1. Rapid flu negative. Positive UA. Past Medical History:        Diagnosis Date    Claudication of left lower extremity ([facility] 5/5/2014    COPD (chronic obstructive pulmonary disease) (HCC)     Depression     GERD (gastroesophageal reflux disease)     Hyperlipidemia     Hypertension     Osteoporosis     Peripheral vascular disease ([facility]     s/p stent bilat legs       Past Surgical History:        Procedure Laterality Date    APPENDECTOMY      BREAST SURGERY      biopsy, left    CHOLECYSTECTOMY      IR FEMORAL POPLITEAL BYPASS GRAFT  5/07    Femoral Femoral Crossover bypass/ Dr Roni Waller         Medications Prior to Admission:    Prior to Admission medications    Medication Sig Start Date End Date Taking?  Authorizing Provider   albuterol sulfate  (90 Base) MCG/ACT inhaler INHALE 2 PUFFS BY MOUTH FOUR TIMES DAILY AS NEEDED FOR SHORTNESS OF BREATH 3/25/20   fstspop Babb MD   FEROSUL 325 (65 Fe) MG tablet TAKE 1 TABLET BY MOUTH DAILY 2/14/20   Rebecca Pearce [address], APRN - CNP   metoclopramide (REGLAN) 10 MG tablet TAKE 1 TABLET BY MOUTH at breakfast and dinner daily 2/12/20   Fredi Babb MD   atorvastatin (LIPITOR) 40 MG tablet TAKE 1 TABLET BY MOUTH DAILY 1/6/20   Fredi Babb MD   pantoprazole (PROTONIX) 40 MG tablet TAKE 1 TABLET BY MOUTH DAILY 8/29/19   [facility], APRN - CNP   furosemide (LASIX) 20 MG tablet TAKE 1 TABLET BY MOUTH DAILY 7/17/19   Neenatspop Babb MD   alendronate (FOSAMAX) 70 MG tablet TAKE 1 TABLET BY MOUTH EVERY 7 DAYS 7/2/18   M Noy Pineda MD   anagrelide (AGRYLIN) 0.5 MG capsule TAKE ONE CAPSULE BY MOUTH ON MONDAY, WEDNESDAY, AND FRIDAY AND 2 CAPSULES ON TUESDAY, 1200 [facility], 127 [facility], 50 [facility] 6/6/17   Sheelajose Weber

## 2020-04-02 LAB
ALBUMIN SERPL-MCNC: 3 G/DL (ref 3.4–5)
ANION GAP SERPL CALCULATED.3IONS-SCNC: 16 MMOL/L (ref 3–16)
BUN BLDV-MCNC: 23 MG/DL (ref 7–20)
CALCIUM SERPL-MCNC: 8.3 MG/DL (ref 8.3–10.6)
CHLORIDE BLD-SCNC: 110 MMOL/L (ref 99–110)
CHOLESTEROL, TOTAL: 101 MG/DL (ref 0–199)
CO2: 24 MMOL/L (ref 21–32)
CREAT SERPL-MCNC: 1.2 MG/DL (ref 0.6–1.2)
EKG ATRIAL RATE: 96 BPM
EKG DIAGNOSIS: NORMAL
EKG P AXIS: 84 DEGREES
EKG P-R INTERVAL: 126 MS
EKG Q-T INTERVAL: 332 MS
EKG QRS DURATION: 66 MS
EKG QTC CALCULATION (BAZETT): 419 MS
EKG R AXIS: 65 DEGREES
EKG T AXIS: 32 DEGREES
EKG VENTRICULAR RATE: 96 BPM
FERRITIN: 38.8 NG/ML (ref 15–150)
FOLATE: 10.3 NG/ML (ref 4.78–24.2)
GFR AFRICAN AMERICAN: 52
GFR NON-AFRICAN AMERICAN: 43
GLUCOSE BLD-MCNC: 109 MG/DL (ref 70–99)
GLUCOSE BLD-MCNC: 69 MG/DL (ref 70–99)
HCT VFR BLD CALC: 31.8 % (ref 36–48)
HCT VFR BLD CALC: 32 % (ref 36–48)
HDLC SERPL-MCNC: 37 MG/DL (ref 40–60)
HEMOGLOBIN: 10.3 G/DL (ref 12–16)
IMMATURE RETIC FRACT: 0.62 (ref 0.21–0.37)
IRON SATURATION: 8 % (ref 15–50)
IRON: 21 UG/DL (ref 37–145)
LDL CHOLESTEROL CALCULATED: 49 MG/DL
LV EF: 45 %
LVEF MODALITY: NORMAL
MAGNESIUM: 2 MG/DL (ref 1.8–2.4)
MCH RBC QN AUTO: 25.1 PG (ref 26–34)
MCHC RBC AUTO-ENTMCNC: 32.4 G/DL (ref 31–36)
MCV RBC AUTO: 77.4 FL (ref 80–100)
PDW BLD-RTO: 22.3 % (ref 12.4–15.4)
PERFORMED ON: ABNORMAL
PHOSPHORUS: 3.9 MG/DL (ref 2.5–4.9)
PLATELET # BLD: 251 K/UL (ref 135–450)
PLATELET SLIDE REVIEW: ADEQUATE
PMV BLD AUTO: 9.3 FL (ref 5–10.5)
POTASSIUM SERPL-SCNC: 3.3 MMOL/L (ref 3.5–5.1)
PREALBUMIN: 11.6 MG/DL (ref 20–40)
RBC # BLD: 4.1 M/UL (ref 4–5.2)
RETICULOCYTE ABSOLUTE COUNT: 0.13 M/UL (ref 0.02–0.1)
RETICULOCYTE COUNT PCT: 3.09 % (ref 0.5–2.18)
SLIDE REVIEW: ABNORMAL
SODIUM BLD-SCNC: 144 MMOL/L (ref 136–145)
SODIUM BLD-SCNC: 150 MMOL/L (ref 136–145)
TOTAL IRON BINDING CAPACITY: 275 UG/DL (ref 260–445)
TRIGL SERPL-MCNC: 77 MG/DL (ref 0–150)
VITAMIN B-12: 376 PG/ML (ref 211–911)
VLDLC SERPL CALC-MCNC: 15 MG/DL
WBC # BLD: 7.8 K/UL (ref 4–11)

## 2020-04-02 PROCEDURE — 93010 ELECTROCARDIOGRAM REPORT: CPT | Performed by: INTERNAL MEDICINE

## 2020-04-02 PROCEDURE — 82746 ASSAY OF FOLIC ACID SERUM: CPT

## 2020-04-02 PROCEDURE — 1200000000 HC SEMI PRIVATE

## 2020-04-02 PROCEDURE — 80061 LIPID PANEL: CPT

## 2020-04-02 PROCEDURE — 6370000000 HC RX 637 (ALT 250 FOR IP): Performed by: INTERNAL MEDICINE

## 2020-04-02 PROCEDURE — 99223 1ST HOSP IP/OBS HIGH 75: CPT | Performed by: INTERNAL MEDICINE

## 2020-04-02 PROCEDURE — 6360000002 HC RX W HCPCS: Performed by: INTERNAL MEDICINE

## 2020-04-02 PROCEDURE — 82728 ASSAY OF FERRITIN: CPT

## 2020-04-02 PROCEDURE — 6370000000 HC RX 637 (ALT 250 FOR IP): Performed by: NURSE PRACTITIONER

## 2020-04-02 PROCEDURE — 85045 AUTOMATED RETICULOCYTE COUNT: CPT

## 2020-04-02 PROCEDURE — 84134 ASSAY OF PREALBUMIN: CPT

## 2020-04-02 PROCEDURE — 36415 COLL VENOUS BLD VENIPUNCTURE: CPT

## 2020-04-02 PROCEDURE — 84295 ASSAY OF SERUM SODIUM: CPT

## 2020-04-02 PROCEDURE — 93306 TTE W/DOPPLER COMPLETE: CPT

## 2020-04-02 PROCEDURE — 94761 N-INVAS EAR/PLS OXIMETRY MLT: CPT

## 2020-04-02 PROCEDURE — 2580000003 HC RX 258: Performed by: INTERNAL MEDICINE

## 2020-04-02 PROCEDURE — 80069 RENAL FUNCTION PANEL: CPT

## 2020-04-02 PROCEDURE — 85027 COMPLETE CBC AUTOMATED: CPT

## 2020-04-02 PROCEDURE — 83540 ASSAY OF IRON: CPT

## 2020-04-02 PROCEDURE — 83550 IRON BINDING TEST: CPT

## 2020-04-02 PROCEDURE — 83735 ASSAY OF MAGNESIUM: CPT

## 2020-04-02 PROCEDURE — 2700000000 HC OXYGEN THERAPY PER DAY

## 2020-04-02 PROCEDURE — 82607 VITAMIN B-12: CPT

## 2020-04-02 RX ORDER — FUROSEMIDE 10 MG/ML
20 INJECTION INTRAMUSCULAR; INTRAVENOUS 2 TIMES DAILY
Status: DISCONTINUED | OUTPATIENT
Start: 2020-04-02 | End: 2020-04-04

## 2020-04-02 RX ORDER — FUROSEMIDE 10 MG/ML
40 INJECTION INTRAMUSCULAR; INTRAVENOUS 2 TIMES DAILY
Status: DISCONTINUED | OUTPATIENT
Start: 2020-04-02 | End: 2020-04-02

## 2020-04-02 RX ORDER — POTASSIUM CHLORIDE 750 MG/1
20 TABLET, FILM COATED, EXTENDED RELEASE ORAL 2 TIMES DAILY
Status: DISCONTINUED | OUTPATIENT
Start: 2020-04-02 | End: 2020-04-05 | Stop reason: HOSPADM

## 2020-04-02 RX ORDER — FUROSEMIDE 10 MG/ML
20 INJECTION INTRAMUSCULAR; INTRAVENOUS DAILY
Status: DISCONTINUED | OUTPATIENT
Start: 2020-04-02 | End: 2020-04-02

## 2020-04-02 RX ORDER — POTASSIUM CHLORIDE 750 MG/1
40 TABLET, FILM COATED, EXTENDED RELEASE ORAL ONCE
Status: COMPLETED | OUTPATIENT
Start: 2020-04-02 | End: 2020-04-02

## 2020-04-02 RX ADMIN — CEFTRIAXONE 1 G: 1 INJECTION, POWDER, FOR SOLUTION INTRAMUSCULAR; INTRAVENOUS at 20:15

## 2020-04-02 RX ADMIN — POTASSIUM CHLORIDE 40 MEQ: 750 TABLET, FILM COATED, EXTENDED RELEASE ORAL at 18:14

## 2020-04-02 RX ADMIN — METOCLOPRAMIDE 10 MG: 10 TABLET ORAL at 09:55

## 2020-04-02 RX ADMIN — ATORVASTATIN CALCIUM 40 MG: 40 TABLET, FILM COATED ORAL at 09:55

## 2020-04-02 RX ADMIN — POTASSIUM CHLORIDE 20 MEQ: 750 TABLET, FILM COATED, EXTENDED RELEASE ORAL at 20:15

## 2020-04-02 RX ADMIN — SODIUM CHLORIDE, PRESERVATIVE FREE 10 ML: 5 INJECTION INTRAVENOUS at 09:57

## 2020-04-02 RX ADMIN — IRON SUCROSE 100 MG: 20 INJECTION, SOLUTION INTRAVENOUS at 18:14

## 2020-04-02 RX ADMIN — ANAGRELIDE 1 MG: 0.5 CAPSULE ORAL at 10:30

## 2020-04-02 RX ADMIN — FUROSEMIDE 20 MG: 10 INJECTION, SOLUTION INTRAMUSCULAR; INTRAVENOUS at 18:14

## 2020-04-02 RX ADMIN — SODIUM CHLORIDE, PRESERVATIVE FREE 10 ML: 5 INJECTION INTRAVENOUS at 20:16

## 2020-04-02 RX ADMIN — FUROSEMIDE 40 MG: 10 INJECTION, SOLUTION INTRAMUSCULAR; INTRAVENOUS at 09:55

## 2020-04-02 RX ADMIN — ASPIRIN 81 MG 162 MG: 81 TABLET ORAL at 09:55

## 2020-04-02 RX ADMIN — ENOXAPARIN SODIUM 30 MG: 30 INJECTION SUBCUTANEOUS at 09:54

## 2020-04-02 RX ADMIN — METOCLOPRAMIDE 10 MG: 10 TABLET ORAL at 18:14

## 2020-04-02 RX ADMIN — PANTOPRAZOLE SODIUM 40 MG: 40 TABLET, DELAYED RELEASE ORAL at 09:55

## 2020-04-02 NOTE — CONSULTS
Referring practitioner: Keyonna Cat NP  Reason for Consultation: CHF/elevated troponin  Chief Complaint: Shortness of breath    Subjective:   History of Present Illness:  Nancy Keene is a 80 y.o. patient who presented to the hospital with complaints of shortness of breath. The patient is a challenging historian as she is not oriented to time and seems confused to situation. History is also obtained via chart review has no family members are currently allowed in the hospital.  The patient lives at home with her son. She had a virtual visit with her primary care physician on 3/30/2020 where the son reported that the patient was becoming more short of breath. The patient currently has no complaints and denies chest pains or shortness of breath. Evidently EMS was called for worsening shortness of breath and the patient was found to be hypoxic at 87% on room air. She is currently satting greater than 94% on 2 L nasal cannula and appears comfortable. The patient has a limited functional status but states she is able to walk with assistance. Reportedly the patient's Lasix dose was decreased secondary to urinary incontinence. Current UA is suggestive of an infection and culture is pending. Past Medical History:   has a past medical history of Claudication of left lower extremity ([address] COPD (chronic obstructive pulmonary disease) ([address] Depression, GERD (gastroesophageal reflux disease), Hyperlipidemia, Hypertension, Osteoporosis, and Peripheral vascular disease ([address] Surgical History:   has a past surgical history that includes Cholecystectomy; Tubal ligation; Breast surgery; IR Femoral Popliteal Bypass Graft (5/07); and Appendectomy. Social History:   reports that she quit smoking about 13 months ago. Her smoking use included cigarettes. She has never used smokeless tobacco. She reports that she does not drink alcohol or use drugs.      Family History:  family history includes Heart Disease in her father; Heart Disease (age of onset: 76) in her mother. Home Medications:  Were reviewed and are listed in nursing record and/or below  Prior to Admission medications    Medication Sig Start Date End Date Taking? Authorizing Provider   albuterol sulfate  (90 Base) MCG/ACT inhaler INHALE 2 PUFFS BY MOUTH FOUR TIMES DAILY AS NEEDED FOR SHORTNESS OF BREATH 3/25/20  Yes Irais Wren MD   FEROSUL 325 (65 Fe) MG tablet TAKE 1 TABLET BY MOUTH DAILY 2/14/20  Yes FATOUMATA Hicks - CNP   metoclopramide (REGLAN) 10 MG tablet TAKE 1 TABLET BY MOUTH at breakfast and dinner daily 2/12/20  Yes Irais Wren MD   atorvastatin (LIPITOR) 40 MG tablet TAKE 1 TABLET BY MOUTH DAILY 1/6/20  Yes Irais Wren MD   pantoprazole (PROTONIX) 40 MG tablet TAKE 1 TABLET BY MOUTH DAILY 8/29/19  Yes FATOUMATA Becerril - CNP   furosemide (LASIX) 20 MG tablet TAKE 1 TABLET BY MOUTH DAILY 7/17/19  Yes M Rome Sandhoff, MD   anagrelide (AGRYLIN) 0.5 MG capsule TAKE ONE CAPSULE BY MOUTH ON MONDAY, WEDNESDAY, AND FRIDAY AND 2 CAPSULES ON TUESDAY, [facility], [facility], AND SUNDAY 6/6/17  Yes Neo Wade MD   aspirin 81 MG tablet Take 162 mg by mouth.    Yes Historical Provider, MD        CURRENT Medications:  potassium chloride (KLOR-CON) extended release tablet 40 mEq, Once  potassium chloride (KLOR-CON) extended release tablet 20 mEq, BID  furosemide (LASIX) injection 20 mg, Daily  aspirin chewable tablet 162 mg, Daily  atorvastatin (LIPITOR) tablet 40 mg, Daily  metoclopramide (REGLAN) tablet 10 mg, BID  pantoprazole (PROTONIX) tablet 40 mg, Daily  sodium chloride flush 0.9 % injection 10 mL, 2 times per day  sodium chloride flush 0.9 % injection 10 mL, PRN  acetaminophen (TYLENOL) tablet 650 mg, Q6H PRN    Or  acetaminophen (TYLENOL) suppository 650 mg, Q6H PRN  polyethylene glycol (GLYCOLAX) packet 17 g, Daily PRN  promethazine (PHENERGAN) tablet 12.5 mg, Q6H PRN    Or  ondansetron (ZOFRAN) injection 4 mg, Q6H PRN  perflutren lipid microspheres (DEFINITY) injection 1.65 mg, ONCE PRN  cefTRIAXone (ROCEPHIN) 1 g in sterile water 10 mL IV syringe, Q24H  albuterol (PROVENTIL) nebulizer solution 2.5 mg, Q6H PRN  enoxaparin (LOVENOX) injection 30 mg, Daily  [START ON 4/3/2020] anagrelide (AGRYLIN) capsule 0.5 mg, Q MWF  anagrelide (AGRYLIN) capsule 1 mg, Once per day on Sun Tue Thu Sat    Allergies:  Codeine     Review of Systems:   · Constitutional: no unanticipated weight loss. There's been no change in energy level, sleep pattern, or activity level. No fevers, chills. · Eyes: No visual changes or diplopia. No scleral icterus. · ENT: No Headaches, hearing loss or vertigo. No mouth sores or sore throat. · Cardiovascular: as reviewed in HPI  · Respiratory: No cough or wheezing, no sputum production. No hemoptysis. · Gastrointestinal: No abdominal pain, appetite loss, blood in stools. No change in bowel or bladder habits. · Genitourinary: No dysuria, trouble voiding, or hematuria. · Musculoskeletal:  No gait disturbance, no joint complaints. · Integumentary: No rash or pruritis. · Neurological: No headache, diplopia, change in muscle strength, numbness or tingling. · Psychiatric: No anxiety or depression. · Endocrine: No temperature intolerance. No excessive thirst, fluid intake, or urination. No tremor. · Hematologic/Lymphatic: No abnormal bruising or bleeding, blood clots or swollen lymph nodes. · Allergic/Immunologic: No nasal congestion or hives. Objective:   PHYSICAL EXAM:    Vitals:    04/02/20 0836   BP: 119/57   Pulse: 69   Resp: 16   Temp: 98.5   SpO2: 96%    Weight: 91 lb 11.4 oz (41.6 kg)       General Appearance:  Alert, cooperative, no respiratory distress, frail, elderly. Head:  Normocephalic, without obvious abnormality, atraumatic. Eyes:  Pupils equal and round. No scleral icterus. Mouth: Moist mucosa, no pharyngeal erythema.    Nose: Nares normal. No drainage or sinus tenderness. Neck: Supple, symmetrical, trachea midline. No adenopathy. No tenderness/mass/nodules. No carotid bruit or elevated JVD. Lungs:   Clear to auscultation bilaterally, respirations unlabored. No wheeze, rales, or rhonchi. Chest Wall:  No tenderness or deformity. Heart:  Regular rate. S1/S2 normal. No murmur, rub, or gallop. Abdomen:   Soft, non-tender, bowel sounds active. Musculoskeletal:  + Muscle wasting. No digital clubbing. Extremities: Extremities normal, atraumatic. No cyanosis or edema. Pulses: 2+ radial and carotid pulses, symmetric. Skin: No rashes or lesions. Pysch: Normal mood and affect. Alert and oriented x 2. Patient is not oriented to time and seems confused to situation. Neurologic: Generalized weakness. Moves all extremities. Follows commands. Labs     CBC:   Lab Results   Component Value Date    WBC 7.8 2020    RBC 4.10 2020    RBC 5.12 2017    HGB 10.3 2020    HCT 31.8 2020    MCV 77.4 2020    RDW 22.3 2020     2020     CMP:  Lab Results   Component Value Date     2020    K 3.3 2020    K 4.1 2020     2020    CO2 24 2020    BUN 23 2020    CREATININE 1.2 2020    GFRAA 52 2020    GFRAA >60 2013    AGRATIO 1.1 2020    LABGLOM 43 2020    GLUCOSE 69 2020    GLUCOSE 87 2017    PROT 6.7 2020    PROT 7.5 2017    CALCIUM 8.3 2020    BILITOT 0.5 2020    ALKPHOS 127 2020    AST 39 2020    ALT 39 2020     PT/INR:  No results found for: PTINR  HgBA1c:No results found for: LABA1C  Lab Results   Component Value Date    CKTOTAL 121 2014    TROPONINI 0.03 (H) 2020       Cardiac Data     EK20. Normal sinus rhythm with premature atrial complexes. Poor R wave progression. Nonspecific T wave abnormalities. Echo: 2020.   Overall left ventricular systolic in the care of Juliann Soto.  Esther Meek, [address]  4/2/2020 12:07 PM

## 2020-04-02 NOTE — PROGRESS NOTES
Pharmacy Medication History Note    List of current medications patient is taking is complete. Source of Information:   1. List of medications in EPIC   2. Interview with Granddaughter Alissa Sanchez   3. Dispensing history    Medication Notes:  1. Unsure if morning medications were taken this am.  2. List of medications read off of medication bottles from patient's home. 3. Takes anagrelide 0.5 mg caps: One capsule on M/W/F and two capsules on T/H/Sa/Su. Pharmacy does not stock this medication. Patient's granddaughter will bring it in from home on 4-2-20.   4. No longer takes Fosamax. 5. Takes  mg in the morning. Lipitor is taken in the morning. 6. No other herbal or OTC medication.       [facility], [facility], [place] 4/1/2020  9:52 PM

## 2020-04-03 ENCOUNTER — TELEPHONE (OUTPATIENT)
Dept: CARDIOLOGY CLINIC | Age: 85
End: 2020-04-03

## 2020-04-03 LAB
ALBUMIN SERPL-MCNC: 2.9 G/DL (ref 3.4–5)
ANION GAP SERPL CALCULATED.3IONS-SCNC: 12 MMOL/L (ref 3–16)
BUN BLDV-MCNC: 20 MG/DL (ref 7–20)
CALCIUM SERPL-MCNC: 8.2 MG/DL (ref 8.3–10.6)
CHLORIDE BLD-SCNC: 104 MMOL/L (ref 99–110)
CO2: 28 MMOL/L (ref 21–32)
CREAT SERPL-MCNC: 1 MG/DL (ref 0.6–1.2)
GFR AFRICAN AMERICAN: >60
GFR NON-AFRICAN AMERICAN: 53
GLUCOSE BLD-MCNC: 83 MG/DL (ref 70–99)
HCT VFR BLD CALC: 32 % (ref 36–48)
HEMOGLOBIN: 10.4 G/DL (ref 12–16)
MAGNESIUM: 2 MG/DL (ref 1.8–2.4)
MCH RBC QN AUTO: 25 PG (ref 26–34)
MCHC RBC AUTO-ENTMCNC: 32.5 G/DL (ref 31–36)
MCV RBC AUTO: 77 FL (ref 80–100)
PDW BLD-RTO: 21.9 % (ref 12.4–15.4)
PHOSPHORUS: 3.8 MG/DL (ref 2.5–4.9)
PLATELET # BLD: 253 K/UL (ref 135–450)
PLATELET SLIDE REVIEW: ADEQUATE
PMV BLD AUTO: 9.3 FL (ref 5–10.5)
POTASSIUM SERPL-SCNC: 3.7 MMOL/L (ref 3.5–5.1)
RBC # BLD: 4.15 M/UL (ref 4–5.2)
SLIDE REVIEW: ABNORMAL
SODIUM BLD-SCNC: 144 MMOL/L (ref 136–145)
WBC # BLD: 7.6 K/UL (ref 4–11)

## 2020-04-03 PROCEDURE — 94761 N-INVAS EAR/PLS OXIMETRY MLT: CPT

## 2020-04-03 PROCEDURE — 97162 PT EVAL MOD COMPLEX 30 MIN: CPT

## 2020-04-03 PROCEDURE — 97116 GAIT TRAINING THERAPY: CPT

## 2020-04-03 PROCEDURE — 97166 OT EVAL MOD COMPLEX 45 MIN: CPT

## 2020-04-03 PROCEDURE — 6360000002 HC RX W HCPCS: Performed by: INTERNAL MEDICINE

## 2020-04-03 PROCEDURE — 6370000000 HC RX 637 (ALT 250 FOR IP): Performed by: NURSE PRACTITIONER

## 2020-04-03 PROCEDURE — 97530 THERAPEUTIC ACTIVITIES: CPT

## 2020-04-03 PROCEDURE — 99233 SBSQ HOSP IP/OBS HIGH 50: CPT | Performed by: INTERNAL MEDICINE

## 2020-04-03 PROCEDURE — 85027 COMPLETE CBC AUTOMATED: CPT

## 2020-04-03 PROCEDURE — 6370000000 HC RX 637 (ALT 250 FOR IP): Performed by: INTERNAL MEDICINE

## 2020-04-03 PROCEDURE — 97535 SELF CARE MNGMENT TRAINING: CPT

## 2020-04-03 PROCEDURE — 2700000000 HC OXYGEN THERAPY PER DAY

## 2020-04-03 PROCEDURE — 80069 RENAL FUNCTION PANEL: CPT

## 2020-04-03 PROCEDURE — 36415 COLL VENOUS BLD VENIPUNCTURE: CPT

## 2020-04-03 PROCEDURE — 1200000000 HC SEMI PRIVATE

## 2020-04-03 PROCEDURE — 83735 ASSAY OF MAGNESIUM: CPT

## 2020-04-03 PROCEDURE — 2580000003 HC RX 258: Performed by: INTERNAL MEDICINE

## 2020-04-03 RX ADMIN — FUROSEMIDE 20 MG: 10 INJECTION, SOLUTION INTRAMUSCULAR; INTRAVENOUS at 08:53

## 2020-04-03 RX ADMIN — PANTOPRAZOLE SODIUM 40 MG: 40 TABLET, DELAYED RELEASE ORAL at 08:53

## 2020-04-03 RX ADMIN — POTASSIUM CHLORIDE 20 MEQ: 750 TABLET, FILM COATED, EXTENDED RELEASE ORAL at 08:53

## 2020-04-03 RX ADMIN — FUROSEMIDE 20 MG: 10 INJECTION, SOLUTION INTRAMUSCULAR; INTRAVENOUS at 17:59

## 2020-04-03 RX ADMIN — Medication 10 ML: at 14:52

## 2020-04-03 RX ADMIN — IRON SUCROSE 100 MG: 20 INJECTION, SOLUTION INTRAVENOUS at 14:51

## 2020-04-03 RX ADMIN — ENOXAPARIN SODIUM 30 MG: 30 INJECTION SUBCUTANEOUS at 08:52

## 2020-04-03 RX ADMIN — SODIUM CHLORIDE, PRESERVATIVE FREE 10 ML: 5 INJECTION INTRAVENOUS at 21:49

## 2020-04-03 RX ADMIN — CEFTRIAXONE 1 G: 1 INJECTION, POWDER, FOR SOLUTION INTRAMUSCULAR; INTRAVENOUS at 21:48

## 2020-04-03 RX ADMIN — METOCLOPRAMIDE 10 MG: 10 TABLET ORAL at 17:59

## 2020-04-03 RX ADMIN — SODIUM CHLORIDE, PRESERVATIVE FREE 10 ML: 5 INJECTION INTRAVENOUS at 08:58

## 2020-04-03 RX ADMIN — METOCLOPRAMIDE 10 MG: 10 TABLET ORAL at 08:59

## 2020-04-03 RX ADMIN — ASPIRIN 81 MG 162 MG: 81 TABLET ORAL at 08:53

## 2020-04-03 RX ADMIN — ATORVASTATIN CALCIUM 40 MG: 40 TABLET, FILM COATED ORAL at 08:53

## 2020-04-03 RX ADMIN — POTASSIUM CHLORIDE 20 MEQ: 750 TABLET, FILM COATED, EXTENDED RELEASE ORAL at 21:49

## 2020-04-03 RX ADMIN — ANAGRELIDE 0.5 MG: 0.5 CAPSULE ORAL at 08:56

## 2020-04-03 ASSESSMENT — PAIN SCALES - GENERAL
PAINLEVEL_OUTOF10: 0
PAINLEVEL_OUTOF10: 0

## 2020-04-03 NOTE — PROGRESS NOTES
Hospitalist Progress Note      PCP: Danna Nickerson MD    Date of Admission: 4/1/2020    Chief Complaint:   Chief Complaint   Patient presents with    Shortness of Breath     Arrived by [place] with SOB for past two days. No fever, no cough. Informed by PCP to come to ED. 87% on room air upon EMS arrival.      Hospital Course: 80 y.o. female with PMH significant for COPD, depression, hyperlipidemia, hypertension, perivascular vascular disease status post bilateral stents who presents to [facility] with shortness of breath. No prior TTE. Symptoms, CXR most consistent with pulm edema, TTE with EF 45%, DD. Also treating for UTI. Subjective: No complaints this morning. Working with PT/OT. Denies chest pain, shortness of breath, nausea, vomiting, fevers, abdominal pain.         Medications:  Reviewed    Infusion Medications   Scheduled Medications    potassium chloride  20 mEq Oral BID    iron sucrose  100 mg Intravenous Q24H    furosemide  20 mg Intravenous BID    aspirin  162 mg Oral Daily    atorvastatin  40 mg Oral Daily    metoclopramide  10 mg Oral BID    pantoprazole  40 mg Oral Daily    sodium chloride flush  10 mL Intravenous 2 times per day    cefTRIAXone (ROCEPHIN) IV  1 g Intravenous Q24H    enoxaparin  30 mg Subcutaneous Daily    anagrelide  0.5 mg Oral Q MWF    anagrelide  1 mg Oral Once per day on Sun Tue Thu Sat     PRN Meds: sodium chloride flush, acetaminophen **OR** acetaminophen, polyethylene glycol, promethazine **OR** ondansetron, perflutren lipid microspheres, albuterol      Intake/Output Summary (Last 24 hours) at 4/3/2020 0912  Last data filed at 4/3/2020 0709  Gross per 24 hour   Intake 600 ml   Output 800 ml   Net -200 ml       Physical Exam Performed:    /75   Pulse 89   Temp 97.5 °F (36.4 °C)   Resp 20   Ht 5' (1.524 m)   Wt 85 lb 12.1 oz (38.9 kg)   SpO2 95%   BMI 16.75 kg/m²     General appearance: No apparent distress, appears stated age and software. Please disregard any translational errors.

## 2020-04-03 NOTE — CONSULTS
This patient has been deemed as low-priority, with nutrition evaluation and/or nutrition education non-essential to patient direct care at this time. Education: Consult received for Heart failure diet education. Direct nutrition education will be postponed, with educational materials provided to nursing staff and included in patient discharge plans. The patient will still be monitored for a change in status and re-entery into nutrition care at a later date. Consult dietitian if nutrition interventions essential to patient care is needed.      Nick Poster, [address] [place] [address], :    Office:  [phone]

## 2020-04-03 NOTE — CARE COORDINATION
[facility] received referral from PT for [address]. Will need MD ORDERS and [address] in MD's CHART NOTES  (see documentation shortcut below)    SHORTCUT:   Please use the Smart Phrase ( .BED ) in a progress note. FOR WEEKEND DISCHARGE:  Please call [facility] at [phone] to notify of discharge and to coordinate equipment delivery.     Thank you for the referral.  Electronically signed by Anders Fortune on 4/3/2020 at 1:26 PM  Cell ph# [phone]

## 2020-04-03 NOTE — PROGRESS NOTES
at the end of session requiring max A for static stance. Per nurse, the pt's family is declining any other d/c other than home and her son is getting a hospital bed for home. IF home, the pt requires 24/7 hands-on care and would benefit from home PT if family is agreeable. Will con't to follow. Prognosis: Fair  Decision Making: Medium Complexity  History: see above  Exam: see above  Clinical Presentation: evolving  PT Education: PT Role;General Safety;Goals  Barriers to Learning: cog  REQUIRES PT FOLLOW UP: Yes  Activity Tolerance  Activity Tolerance: Patient limited by fatigue;Patient limited by endurance; Patient limited by cognitive status       Patient Diagnosis(es): The primary encounter diagnosis was Other congestive heart failure ([address] A diagnosis of Hypoxia was also pertinent to this visit. has a past medical history of Claudication of left lower extremity ([address] COPD (chronic obstructive pulmonary disease) ([address] Depression, GERD (gastroesophageal reflux disease), Hyperlipidemia, Hypertension, Osteoporosis, and Peripheral vascular disease ([address] has a past surgical history that includes Cholecystectomy; Tubal ligation; Breast surgery; IR Femoral Popliteal Bypass Graft (5/07); and Appendectomy. Restrictions  Restrictions/Precautions  Restrictions/Precautions: Fall Risk  Position Activity Restriction  Other position/activity restrictions: 2L O2 via NC  Vision/Hearing  Vision: Within Functional Limits(reprots she had catarats removed)  Hearing: Exceptions to [facility]  Hearing Exceptions: Hard of hearing/hearing concerns     Subjective  General  Chart Reviewed: Yes  Patient assessed for rehabilitation services?: Yes  Additional Pertinent Hx: The pt is an 79 yo female who came to the ED with c/o of SOB. With EMS, she was found to be hypoxic with O2 sats at 87% on room air and with 2L o2 was 94%. UA culture is pending but suggestive of infection.      PMHx: claudication of L LE, COPD, depression, HLD, HTN, report a personal therapy goal but does want to go home with her son       Therapy Time   Individual Concurrent Group Co-treatment   Time In 1058         Time Out 1151         Minutes 53         Timed Code Treatment Minutes: 38 Minutes       Electronically signed by Shanita Lopez, PT [id number] on 4/3/2020 at 11:52 AM

## 2020-04-03 NOTE — PROGRESS NOTES
Occupational Therapy   Occupational Therapy Initial Assessment  Date: 4/3/2020   Patient Name: Dennie Bent  MRN: [id number]     : 1934    Date of Service: 4/3/2020    Discharge Recommendations:  Continue to assess pending progress, 24 hour supervision or assist, Home with Home health OT, 3-5 sessions per week(pt would benefit from skilled therapy, per chart, family plans on taking patient home. If return home, recommend 24 hour assist and home OT)   Equipment needed: hospital bed (per chart, son is already getting bed)    Dennie Bent scored a  on the AM-PAC ADL form. Assessment   Performance deficits / Impairments: Decreased functional mobility ; Decreased cognition;Decreased high-level IADLs;Decreased ADL status; Decreased endurance;Decreased strength;Decreased balance;Decreased safe awareness  Assessment: Pt is a 81 y/o female admitted for SOB and being treated for CHF and UTI. PTA pt lives at home with family. Pt reports family assists with all ADLs and pt ambulates with RW and family assist. Today, pt required min/mod A to stand dependent on surface height and fatigue. Pt with posterior lean when standing and unable to correct, therefore required max A to maintain. Pt was able to ambulate chair > bathroom with use of Rw and mod A d/t posterior lean and assist to guide walker to keep forward momentum. At this time, anticipate pt will require max A for ADLs based on balance and cognition. Pt will benefit from continued skilled therapy. Per chart, familly refusing SNF placement and plans on taking patient home. If return home, recommend 24 hour assist and home OT.    Prognosis: Fair  History: see above  Exam: ADLs, balance, ROM, cognition, transfers  Assistance / Modification: maricel ANGELES  OT Education: OT Role;Plan of Care  REQUIRES OT FOLLOW UP: Yes  Activity Tolerance  Activity Tolerance: Treatment limited secondary to decreased cognition  Safety Devices  Safety Devices in place: at end of session, alarm activated  Transfers  Transfer Comments: min A to stand from bed to maricel azevedo, mod A to stand from chair to RW,      Cognition  Overall Cognitive Status: Exceptions  Arousal/Alertness: Appropriate responses to stimuli  Following Commands: Follows one step commands with increased time; Follows one step commands with repetition  Memory: Decreased recall of recent events  Insights: Decreased awareness of deficits  Initiation: Requires cues for some  Sequencing: Requires cues for some         LUE AROM (degrees)  LUE AROM : WFL  Left Hand AROM (degrees)  Left Hand AROM: WFL  RUE AROM (degrees)  RUE General AROM: decreased shoulder ROM observed- pt had difficulty placing on cross bar of maricel azevedo. Right Hand AROM (degrees)  Right Hand AROM: WFL          Plan   Plan  Times per week: 3-5  Current Treatment Recommendations: Strengthening, Safety Education & Training, Gait Training, Balance Training, Self-Care / ADL, Functional Mobility Training, Endurance Training    AM-PAC Score  AM-[facility] Inpatient Daily Activity Raw Score: 11 (04/03/20 1149)  AM-PAC Inpatient ADL T-Scale Score : 29.04 (04/03/20 1149)  ADL Inpatient CMS 0-100% Score: 70.42 (04/03/20 1149)  ADL Inpatient CMS G-Code Modifier : CL (04/03/20 1149)    Goals  Short term goals  Time Frame for Short term goals: Prior to DC:   Short term goal 1: Pt will tolerate standing > 1 min for functional task with CGA  Short term goal 2: Pt will complete ADL transfers/mobility with min A  Short term goal 3: Pt will complete grooming tasks with set up  Short term goal 4: pt will complete toileting with min A  Long term goals  Time Frame for Long term goals : stgs=ltgs  Patient Goals   Patient goals : to return home to see [address] Time   Individual Concurrent Group Co-treatment   Time In 1055         Time Out 1150         Minutes 55         Timed Code Treatment Minutes: 54 Minutes     This note to serve as OT d/c summary if pt is d/c-ed prior

## 2020-04-03 NOTE — TELEPHONE ENCOUNTER
Attempted to call pt to schedule [facility] hospital f/u with Dr. Yoseph Jones or HARI Hudson, within 7 days, no answer, LVM for pt to call back to schedule.

## 2020-04-03 NOTE — PROGRESS NOTES
Writer spoke with pt's son Velasquez Quarles, he was under impression that pt would be discharged. Today. Writer was not communicated anything about dc today in report, and notes do not indicate discharge today. Updated Velasquez Quarles that pt is still on IV lasix, IV antibiotics, and IV iron, and PT/OT will see pt today. Velasquez Quarles does not want pt to go to a nursing home, he is trying to have a hospital bed delivered to their house today where the pt lives with him. Will continue to monitor and update family.

## 2020-04-03 NOTE — PROGRESS NOTES
Spot checked pt's oxygen, 94% on 2L. Weaned down to 1L, sats still 94%. Turned oxygen off, sats reading 93%. Will continue to monitor.

## 2020-04-03 NOTE — PROGRESS NOTES
Popliteal Bypass Graft (5/07); and Appendectomy. Social History:   reports that she quit smoking about 13 months ago. Her smoking use included cigarettes. She has never used smokeless tobacco. She reports that she does not drink alcohol or use drugs. Family History:  family history includes Heart Disease in her father; Heart Disease (age of onset: 76) in her mother. Home Medications:  Were reviewed and are listed in nursing record and/or below  Prior to Admission medications    Medication Sig Start Date End Date Taking? Authorizing Provider   albuterol sulfate  (90 Base) MCG/ACT inhaler INHALE 2 PUFFS BY MOUTH FOUR TIMES DAILY AS NEEDED FOR SHORTNESS OF BREATH 3/25/20  Yes Figueroa Heredia MD   FEROSUL 325 (65 Fe) MG tablet TAKE 1 TABLET BY MOUTH DAILY 2/14/20  Yes FATOUMATA Awan - CNP   metoclopramide (REGLAN) 10 MG tablet TAKE 1 TABLET BY MOUTH at breakfast and dinner daily 2/12/20  Yes Figueroa Heredia MD   atorvastatin (LIPITOR) 40 MG tablet TAKE 1 TABLET BY MOUTH DAILY 1/6/20  Yes Figueroa Heredia MD   pantoprazole (PROTONIX) 40 MG tablet TAKE 1 TABLET BY MOUTH DAILY 8/29/19  Yes FATOUMATA Burt CNP   furosemide (LASIX) 20 MG tablet TAKE 1 TABLET BY MOUTH DAILY 7/17/19  Yes QUINTIN Valle MD   anagrelide (AGRYLIN) 0.5 MG capsule TAKE ONE CAPSULE BY MOUTH ON MONDAY, WEDNESDAY, AND FRIDAY AND 2 CAPSULES ON TUESDAY, [address] [facility], [address] [facility], AND SUNDAY 6/6/17  Yes Braxton Hand MD   aspirin 81 MG tablet Take 162 mg by mouth.    Yes Historical Provider, MD        CURRENT Medications:  potassium chloride (KLOR-CON) extended release tablet 20 mEq, BID  iron sucrose (VENOFER) injection 100 mg, Q24H  furosemide (LASIX) injection 20 mg, BID  aspirin chewable tablet 162 mg, Daily  atorvastatin (LIPITOR) tablet 40 mg, Daily  metoclopramide (REGLAN) tablet 10 mg, BID  pantoprazole (PROTONIX) tablet 40 mg, Daily  sodium chloride flush 0.9 % injection 10 mL, 2 times per day  sodium chloride flush 0.9 % injection 10 mL, PRN  acetaminophen (TYLENOL) tablet 650 mg, Q6H PRN    Or  acetaminophen (TYLENOL) suppository 650 mg, Q6H PRN  polyethylene glycol (GLYCOLAX) packet 17 g, Daily PRN  promethazine (PHENERGAN) tablet 12.5 mg, Q6H PRN    Or  ondansetron (ZOFRAN) injection 4 mg, Q6H PRN  perflutren lipid microspheres (DEFINITY) injection 1.65 mg, ONCE PRN  cefTRIAXone (ROCEPHIN) 1 g in sterile water 10 mL IV syringe, Q24H  albuterol (PROVENTIL) nebulizer solution 2.5 mg, Q6H PRN  enoxaparin (LOVENOX) injection 30 mg, Daily  anagrelide (AGRYLIN) capsule 0.5 mg, Q MWF  anagrelide (AGRYLIN) capsule 1 mg, Once per day on Sun Tue Thu Sat    Allergies:  Codeine     Review of Systems:   · Constitutional: no unanticipated weight loss. There's been no change in energy level, sleep pattern, or activity level. No fevers, chills. · Eyes: No visual changes or diplopia. No scleral icterus. · ENT: No Headaches, hearing loss or vertigo. No mouth sores or sore throat. · Cardiovascular: as reviewed in HPI  · Respiratory: No cough or wheezing, no sputum production. No hemoptysis. · Gastrointestinal: No abdominal pain, appetite loss, blood in stools. No change in bowel or bladder habits. · Genitourinary: No dysuria, trouble voiding, or hematuria. · Musculoskeletal:  No gait disturbance, no joint complaints. · Integumentary: No rash or pruritis. · Neurological: No headache, diplopia, change in muscle strength, numbness or tingling. · Psychiatric: No anxiety or depression. · Endocrine: No temperature intolerance. No excessive thirst, fluid intake, or urination. No tremor. · Hematologic/Lymphatic: No abnormal bruising or bleeding, blood clots or swollen lymph nodes. · Allergic/Immunologic: No nasal congestion or hives.     Objective:   PHYSICAL EXAM:    Vitals:    04/03/20 0810   BP: 125/75   Pulse: 89   Resp: 20   Temp: 97.5   SpO2: 95%      Weight: 85 lb 12.1 oz (38.9 kg)       General

## 2020-04-04 LAB
ALBUMIN SERPL-MCNC: 2.9 G/DL (ref 3.4–5)
ANION GAP SERPL CALCULATED.3IONS-SCNC: 13 MMOL/L (ref 3–16)
BUN BLDV-MCNC: 16 MG/DL (ref 7–20)
CALCIUM SERPL-MCNC: 8.7 MG/DL (ref 8.3–10.6)
CHLORIDE BLD-SCNC: 102 MMOL/L (ref 99–110)
CO2: 28 MMOL/L (ref 21–32)
CREAT SERPL-MCNC: 0.9 MG/DL (ref 0.6–1.2)
GFR AFRICAN AMERICAN: >60
GFR NON-AFRICAN AMERICAN: 59
GLUCOSE BLD-MCNC: 86 MG/DL (ref 70–99)
HCT VFR BLD CALC: 35.8 % (ref 36–48)
HEMOGLOBIN: 11.3 G/DL (ref 12–16)
MAGNESIUM: 2 MG/DL (ref 1.8–2.4)
MCH RBC QN AUTO: 24.7 PG (ref 26–34)
MCHC RBC AUTO-ENTMCNC: 31.5 G/DL (ref 31–36)
MCV RBC AUTO: 78.3 FL (ref 80–100)
ORGANISM: ABNORMAL
PDW BLD-RTO: 22.2 % (ref 12.4–15.4)
PHOSPHORUS: 3.8 MG/DL (ref 2.5–4.9)
PLATELET # BLD: 237 K/UL (ref 135–450)
PLATELET SLIDE REVIEW: ADEQUATE
PMV BLD AUTO: 9.4 FL (ref 5–10.5)
POTASSIUM SERPL-SCNC: 4.5 MMOL/L (ref 3.5–5.1)
PRO-BNP: ABNORMAL PG/ML (ref 0–449)
RBC # BLD: 4.57 M/UL (ref 4–5.2)
SODIUM BLD-SCNC: 143 MMOL/L (ref 136–145)
URINE CULTURE, ROUTINE: ABNORMAL
WBC # BLD: 9.3 K/UL (ref 4–11)

## 2020-04-04 PROCEDURE — 1200000000 HC SEMI PRIVATE

## 2020-04-04 PROCEDURE — 6360000002 HC RX W HCPCS: Performed by: INTERNAL MEDICINE

## 2020-04-04 PROCEDURE — 80069 RENAL FUNCTION PANEL: CPT

## 2020-04-04 PROCEDURE — 85027 COMPLETE CBC AUTOMATED: CPT

## 2020-04-04 PROCEDURE — 36415 COLL VENOUS BLD VENIPUNCTURE: CPT

## 2020-04-04 PROCEDURE — 94680 O2 UPTK RST&XERS DIR SIMPLE: CPT

## 2020-04-04 PROCEDURE — 83735 ASSAY OF MAGNESIUM: CPT

## 2020-04-04 PROCEDURE — 99232 SBSQ HOSP IP/OBS MODERATE 35: CPT | Performed by: NURSE PRACTITIONER

## 2020-04-04 PROCEDURE — 6370000000 HC RX 637 (ALT 250 FOR IP): Performed by: NURSE PRACTITIONER

## 2020-04-04 PROCEDURE — 6370000000 HC RX 637 (ALT 250 FOR IP): Performed by: INTERNAL MEDICINE

## 2020-04-04 PROCEDURE — 2580000003 HC RX 258: Performed by: INTERNAL MEDICINE

## 2020-04-04 PROCEDURE — 83880 ASSAY OF NATRIURETIC PEPTIDE: CPT

## 2020-04-04 RX ORDER — FUROSEMIDE 10 MG/ML
40 INJECTION INTRAMUSCULAR; INTRAVENOUS 2 TIMES DAILY
Status: DISCONTINUED | OUTPATIENT
Start: 2020-04-04 | End: 2020-04-04

## 2020-04-04 RX ORDER — FUROSEMIDE 10 MG/ML
20 INJECTION INTRAMUSCULAR; INTRAVENOUS 2 TIMES DAILY
Status: DISCONTINUED | OUTPATIENT
Start: 2020-04-04 | End: 2020-04-05

## 2020-04-04 RX ADMIN — SODIUM CHLORIDE, PRESERVATIVE FREE 10 ML: 5 INJECTION INTRAVENOUS at 09:34

## 2020-04-04 RX ADMIN — POTASSIUM CHLORIDE 20 MEQ: 750 TABLET, FILM COATED, EXTENDED RELEASE ORAL at 21:25

## 2020-04-04 RX ADMIN — IRON SUCROSE 100 MG: 20 INJECTION, SOLUTION INTRAVENOUS at 17:36

## 2020-04-04 RX ADMIN — ANAGRELIDE 1 MG: 0.5 CAPSULE ORAL at 09:33

## 2020-04-04 RX ADMIN — ENOXAPARIN SODIUM 30 MG: 30 INJECTION SUBCUTANEOUS at 09:32

## 2020-04-04 RX ADMIN — FUROSEMIDE 40 MG: 10 INJECTION, SOLUTION INTRAMUSCULAR; INTRAVENOUS at 09:33

## 2020-04-04 RX ADMIN — METOCLOPRAMIDE 10 MG: 10 TABLET ORAL at 17:36

## 2020-04-04 RX ADMIN — POTASSIUM CHLORIDE 20 MEQ: 750 TABLET, FILM COATED, EXTENDED RELEASE ORAL at 09:32

## 2020-04-04 RX ADMIN — METOCLOPRAMIDE 10 MG: 10 TABLET ORAL at 09:32

## 2020-04-04 RX ADMIN — SODIUM CHLORIDE, PRESERVATIVE FREE 10 ML: 5 INJECTION INTRAVENOUS at 21:32

## 2020-04-04 RX ADMIN — PANTOPRAZOLE SODIUM 40 MG: 40 TABLET, DELAYED RELEASE ORAL at 09:32

## 2020-04-04 RX ADMIN — ASPIRIN 81 MG 162 MG: 81 TABLET ORAL at 09:33

## 2020-04-04 RX ADMIN — ATORVASTATIN CALCIUM 40 MG: 40 TABLET, FILM COATED ORAL at 09:33

## 2020-04-04 RX ADMIN — FUROSEMIDE 20 MG: 10 INJECTION, SOLUTION INTRAMUSCULAR; INTRAVENOUS at 17:36

## 2020-04-04 RX ADMIN — CEFTRIAXONE 1 G: 1 INJECTION, POWDER, FOR SOLUTION INTRAMUSCULAR; INTRAVENOUS at 21:25

## 2020-04-04 ASSESSMENT — PAIN SCALES - GENERAL
PAINLEVEL_OUTOF10: 0
PAINLEVEL_OUTOF10: 0

## 2020-04-04 NOTE — PROGRESS NOTES
Hospitalist Progress Note      PCP: Liliya Noble MD    Date of Admission: 4/1/2020    Chief Complaint:   Chief Complaint   Patient presents with    Shortness of Breath     Arrived by [facility] with SOB for past two days. No fever, no cough. Informed by PCP to come to ED. 87% on room air upon EMS arrival.      Hospital Course: 80 y.o. female with PMH significant for COPD, depression, hyperlipidemia, hypertension, perivascular vascular disease status post bilateral stents who presents to [facility] with shortness of breath. No prior TTE. Symptoms, CXR most consistent with pulm edema, TTE with EF 45%, DD. Also treating for UTI. Subjective: No complaints this morning. Breathing about the same. Failed home O2 screen. Denies chest pain, shortness of breath, nausea, vomiting, fevers, abdominal pain. Attempted to call both sons listed in chart, no answer, left HIPAA compliant voicemail for both.        Medications:  Reviewed    Infusion Medications   Scheduled Medications    furosemide  20 mg Intravenous BID    potassium chloride  20 mEq Oral BID    iron sucrose  100 mg Intravenous Q24H    aspirin  162 mg Oral Daily    atorvastatin  40 mg Oral Daily    metoclopramide  10 mg Oral BID    pantoprazole  40 mg Oral Daily    sodium chloride flush  10 mL Intravenous 2 times per day    cefTRIAXone (ROCEPHIN) IV  1 g Intravenous Q24H    enoxaparin  30 mg Subcutaneous Daily    anagrelide  0.5 mg Oral Q MWF    anagrelide  1 mg Oral Once per day on Sun Tue Thu Sat     PRN Meds: sodium chloride flush, acetaminophen **OR** acetaminophen, polyethylene glycol, promethazine **OR** ondansetron, perflutren lipid microspheres, albuterol    No intake or output data in the 24 hours ending 04/04/20 1353    Physical Exam Performed:    BP (!) 146/76   Pulse 84   Temp 98 °F (36.7 °C) (Oral)   Resp 18   Ht 5' (1.524 m)   Wt 83 lb 12.4 oz (38 kg)   SpO2 93%   BMI 16.36 kg/m²     General appearance: No Result   Pleural effusions are present with basilar pulmonary opacities and pulmonary   vascular congestion. These findings are suggestive of pulmonary edema. Superimposed pneumonia is not excluded. Multiple nonacute appearing right-sided rib fractures. Evidence for prior   injury to the right shoulder. Assessment/Plan:    Active Hospital Problems    Diagnosis    Acute on chronic combined systolic (congestive) and diastolic (congestive) heart failure ([facility]) [I50.43]    Hypoalbuminemia due to protein-calorie malnutrition ([facility]) [E46]    Shortness of breath [R06.02]    PAD (peripheral artery disease) ([facility]) [I73.9]    Microcytic anemia [D50.9]     SOB, acute systolic and diastolic heart failure  Pleural effusions on CXR, elevated BNP, orthopnea. Lasix dose had been decreased from 40 to 20 mg, virtual visit with PCP 3/30 and increased back to 40 mg daily. No TTE on file. - lasix 20 mg IV BID-- 40 mg this AM then resume 20 mg tonight  - TTE with EF 45%, cards consulted, recommending medical management  - I/O  - daily weights     Acute Hypoxic Respiratory Failure  Requiring 2L NC  - wean O2 as able  - IS  - failed home O2 screen, discussed staying one more day to hopefully get her off O2     Rib fractures  Likely 36 weeks old from fall at home. Not complaining of pain.      E coli UTI  UA positive. - Rocephin  - sensitivities finalized, can discharge on cipro     Elevated trop  0.02. Likely from hypoxia, pulm edema. No chest pain, no EKG changes  - trend, flat     COPD  Stable, not in acute exacerbation  - continue home meds     HTN  Well controlled. - not on home meds     Thrombocytosis   Followed by heme, on anagrelide and iron.     CKD 3  Stable. - avoid nephrotoxic meds    Protein calorie malnutrition  Body mass index is 17.14 kg/m².    - add supplements      DVT Prophylaxis: Lovenox  Diet: DIET LOW SODIUM 2 GM; 1800 ml  Dietary Nutrition Supplements: Standard High Calorie Oral

## 2020-04-04 NOTE — PROGRESS NOTES
[facility]    Respiratory Therapy   Home Oxygen Evaluation        Name: Paula Gomez  Medical Record Number: [id number]  Age: 80 y.o. Gender:  female   : 1934  Today's date: 2020  Room: L8J-4377/4133-01      Assessment        BP (!) 148/78   Pulse 94   Temp 97.7 °F (36.5 °C) (Oral)   Resp 18   Ht 5' (1.524 m)   Wt 85 lb 12.1 oz (38.9 kg)   SpO2 93%   BMI 16.75 kg/m²     Patient Active Problem List   Diagnosis    Tobacco use disorder    Abdominal bruit    History of extremity bypass graft    Microcytic anemia    PAD (peripheral artery disease) (HCC)    Mixed hyperlipidemia    Essential hypertension    Gastroesophageal reflux disease    Essential thrombocytosis (HCC)    VIV-2 gene mutation    Femoral artery occlusion, left (HCC)    Iliac artery occlusion, left (HCC)    Bruit of right carotid artery    Simple chronic bronchitis (HCC)    Stage 3 chronic kidney disease (HCC)    Occlusion of cross femoral, 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, bypass graft (HCC)    Shortness of breath    Acute on chronic diastolic heart failure (HCC)    Hypoalbuminemia due to protein-calorie malnutrition (HCC)       Social History:  Social History     Tobacco Use    Smoking status: Former Smoker     Types: Cigarettes     Last attempt to quit: 2019     Years since quittin.1    Smokeless tobacco: Never Used   Substance Use Topics    Alcohol use: No    Drug use: No       Patient Room Air saturation at rest 87  %      Oxygen saturations of 88% or less on RA qualifies patient for Home Oxygen    Patient resting on 2  lmp  with an oxygen saturation of  94 %     Qualifying patient for home oxygen with ambulation and continuous flow  @ 2 lpm.      In your clinical assessment does the Patient Require Portable Oxygen Tanks?     Yes              [facility]  home care company contacted to follow care of patients home oxygen needs on 2020 at 9:14 AM    Patient/caregiver was educated on Home

## 2020-04-05 VITALS
OXYGEN SATURATION: 98 % | HEART RATE: 76 BPM | DIASTOLIC BLOOD PRESSURE: 50 MMHG | HEIGHT: 60 IN | BODY MASS INDEX: 17.14 KG/M2 | RESPIRATION RATE: 14 BRPM | WEIGHT: 87.3 LBS | SYSTOLIC BLOOD PRESSURE: 126 MMHG | TEMPERATURE: 97.8 F

## 2020-04-05 LAB
ALBUMIN SERPL-MCNC: 3.1 G/DL (ref 3.4–5)
ANION GAP SERPL CALCULATED.3IONS-SCNC: 13 MMOL/L (ref 3–16)
BUN BLDV-MCNC: 21 MG/DL (ref 7–20)
CALCIUM SERPL-MCNC: 9.5 MG/DL (ref 8.3–10.6)
CHLORIDE BLD-SCNC: 99 MMOL/L (ref 99–110)
CO2: 32 MMOL/L (ref 21–32)
CREAT SERPL-MCNC: 0.8 MG/DL (ref 0.6–1.2)
GFR AFRICAN AMERICAN: >60
GFR NON-AFRICAN AMERICAN: >60
GLUCOSE BLD-MCNC: 85 MG/DL (ref 70–99)
HCT VFR BLD CALC: 39.6 % (ref 36–48)
HEMOGLOBIN: 12.6 G/DL (ref 12–16)
MAGNESIUM: 2 MG/DL (ref 1.8–2.4)
MCH RBC QN AUTO: 24.8 PG (ref 26–34)
MCHC RBC AUTO-ENTMCNC: 31.9 G/DL (ref 31–36)
MCV RBC AUTO: 77.7 FL (ref 80–100)
PDW BLD-RTO: 22.1 % (ref 12.4–15.4)
PHOSPHORUS: 3.7 MG/DL (ref 2.5–4.9)
PLATELET # BLD: 291 K/UL (ref 135–450)
PLATELET SLIDE REVIEW: ADEQUATE
PMV BLD AUTO: 9.6 FL (ref 5–10.5)
POTASSIUM SERPL-SCNC: 4.9 MMOL/L (ref 3.5–5.1)
RBC # BLD: 5.09 M/UL (ref 4–5.2)
SLIDE REVIEW: ABNORMAL
SODIUM BLD-SCNC: 144 MMOL/L (ref 136–145)
WBC # BLD: 9.9 K/UL (ref 4–11)

## 2020-04-05 PROCEDURE — 6370000000 HC RX 637 (ALT 250 FOR IP): Performed by: NURSE PRACTITIONER

## 2020-04-05 PROCEDURE — 6360000002 HC RX W HCPCS: Performed by: INTERNAL MEDICINE

## 2020-04-05 PROCEDURE — 99232 SBSQ HOSP IP/OBS MODERATE 35: CPT | Performed by: NURSE PRACTITIONER

## 2020-04-05 PROCEDURE — 83735 ASSAY OF MAGNESIUM: CPT

## 2020-04-05 PROCEDURE — 36415 COLL VENOUS BLD VENIPUNCTURE: CPT

## 2020-04-05 PROCEDURE — 2580000003 HC RX 258: Performed by: INTERNAL MEDICINE

## 2020-04-05 PROCEDURE — 85027 COMPLETE CBC AUTOMATED: CPT

## 2020-04-05 PROCEDURE — 80069 RENAL FUNCTION PANEL: CPT

## 2020-04-05 PROCEDURE — 6370000000 HC RX 637 (ALT 250 FOR IP): Performed by: INTERNAL MEDICINE

## 2020-04-05 RX ORDER — TORSEMIDE 20 MG/1
20 TABLET ORAL DAILY
Qty: 30 TABLET | Refills: 2 | Status: SHIPPED | OUTPATIENT
Start: 2020-04-05 | End: 2020-06-17 | Stop reason: SDUPTHER

## 2020-04-05 RX ORDER — FUROSEMIDE 10 MG/ML
20 INJECTION INTRAMUSCULAR; INTRAVENOUS ONCE
Status: COMPLETED | OUTPATIENT
Start: 2020-04-05 | End: 2020-04-05

## 2020-04-05 RX ORDER — FUROSEMIDE 10 MG/ML
40 INJECTION INTRAMUSCULAR; INTRAVENOUS ONCE
Status: DISCONTINUED | OUTPATIENT
Start: 2020-04-05 | End: 2020-04-05

## 2020-04-05 RX ORDER — CIPROFLOXACIN 500 MG/1
500 TABLET, FILM COATED ORAL 2 TIMES DAILY
Qty: 6 TABLET | Refills: 0 | Status: SHIPPED | OUTPATIENT
Start: 2020-04-05 | End: 2020-04-08

## 2020-04-05 RX ORDER — FUROSEMIDE 20 MG/1
40 TABLET ORAL DAILY
Qty: 90 TABLET | Refills: 3 | Status: SHIPPED | OUTPATIENT
Start: 2020-04-05 | End: 2020-04-05 | Stop reason: HOSPADM

## 2020-04-05 RX ADMIN — ENOXAPARIN SODIUM 30 MG: 30 INJECTION SUBCUTANEOUS at 07:54

## 2020-04-05 RX ADMIN — FUROSEMIDE 20 MG: 10 INJECTION, SOLUTION INTRAMUSCULAR; INTRAVENOUS at 09:45

## 2020-04-05 RX ADMIN — METOCLOPRAMIDE 10 MG: 10 TABLET ORAL at 07:54

## 2020-04-05 RX ADMIN — PANTOPRAZOLE SODIUM 40 MG: 40 TABLET, DELAYED RELEASE ORAL at 07:54

## 2020-04-05 RX ADMIN — ASPIRIN 81 MG 162 MG: 81 TABLET ORAL at 07:54

## 2020-04-05 RX ADMIN — POTASSIUM CHLORIDE 20 MEQ: 750 TABLET, FILM COATED, EXTENDED RELEASE ORAL at 07:54

## 2020-04-05 RX ADMIN — ATORVASTATIN CALCIUM 40 MG: 40 TABLET, FILM COATED ORAL at 07:54

## 2020-04-05 RX ADMIN — SODIUM CHLORIDE, PRESERVATIVE FREE 10 ML: 5 INJECTION INTRAVENOUS at 07:54

## 2020-04-05 RX ADMIN — ANAGRELIDE 1 MG: 0.5 CAPSULE ORAL at 07:55

## 2020-04-05 RX ADMIN — FUROSEMIDE 20 MG: 10 INJECTION, SOLUTION INTRAMUSCULAR; INTRAVENOUS at 07:53

## 2020-04-05 ASSESSMENT — PAIN SCALES - GENERAL: PAINLEVEL_OUTOF10: 0

## 2020-04-05 NOTE — PLAN OF CARE
ADVANCED CARE PLANNING    Name:Chelle Casey       :  1934              MRN:  [id number]      Purpose of Encounter: Advanced care planning in light of dementia, possible heart failure, general weakness and decline in functional status. Parties in attendance: :Donte Munoz, Peggy Yin MD, Family members:discussed with Yashira Ku by phone. Decisional Capacity: seems to have capacity but deferred this decision to her DIL Tucker Tika    Diagnosis: Active Problems:    Shortness of breath  Resolved Problems:    * No resolved hospital problems. *    Patients Medical Story: Was doing okay at home with walker until 4-6 weeks ago. General decline in functional status, general weakness, worsening SOB in past 10 days. Concern for UTI. Here with hypoxic resp failure and pulmonary edema. Discussed with patient who deferred code status discussion to Yashira Ku. States patient is DNR-CCA. Goals of Care Determinations: Patient wishes to focus on getting well enough to go back home  Plan: Will notify Deisy Eugene MD of change in care plan. Will look at further interventions as needed. Code Status: At this time patient wishes to be Prior  Time Spent with Patient: > 16 minutes      Electronically signed by Peggy Yin MD on 2020 at 6:22 PM  Thank you Deisy Eugene MD for the opportunity to be involved in this patient's care.
Problem: Falls - Risk of:  Goal: Will remain free from falls  Description: Will remain free from falls  4/3/2020 0406 by Michelle Henao RN  Outcome: Ongoing   Pt free from falls this shift. Fall precautions in place at all times. Call light always within reach. Pt able and agreeable to contact for safety appropriately.     Problem: OXYGENATION/RESPIRATORY FUNCTION  Goal: Patient will maintain patent airway  4/3/2020 0406 by Michelle Henao RN  Outcome: Ongoing     Problem: OXYGENATION/RESPIRATORY FUNCTION  Goal: Patient will achieve/maintain normal respiratory rate/effort  Description: Respiratory rate and effort will be within normal limits for the patient  4/3/2020 0406 by Michelle Henao RN  Outcome: Ongoing     Problem: HEMODYNAMIC STATUS  Goal: Patient has stable vital signs and fluid balance  4/3/2020 0406 by Michelle Henao RN  Outcome: Ongoing
Pt educated for 15 min on CHF. Pt is alert to self, but family will also need information on D/C.
patent airway. Goal: Patient will achieve/maintain normal respiratory rate/effort  Description: Respiratory rate and effort will be within normal limits for the patient  4/5/2020 1005 by Elizabeth Tapia RN  Outcome: Ongoing  Note: Vital signs stable, respiratory rate normal, heart rate is WNLs and regular on cardiac monitor, +2 pulses and less than 3 second cap refill noted in all extremities, body color appropriate for ethnicity and temperature warm, no edema noted, patient repositions self, and daily weights are ordered and +4 lb change from yesterday's weight. MD notified of weight gain. Will continue to monitor and reassess. 4/4/2020 2354 by Stephanie Howard RN  Outcome: Ongoing  Note: Patient respiratory rate within normal limits. Will continue to monitor throughout the shift. Problem: HEMODYNAMIC STATUS  Goal: Patient has stable vital signs and fluid balance  4/5/2020 1005 by Elizabeth Tapia RN  Outcome: Ongoing  Note: Vital signs stable, respiratory rate normal, heart rate is WNLs and regular on cardiac monitor, +2 pulses and less than 3 second cap refill noted in all extremities, body color appropriate for ethnicity and temperature warm, no edema noted, patient repositions self, and daily weights are ordered and +4 lb change from yesterday's weight. MD notified of weight gain. Will continue to monitor and reassess. 4/4/2020 2354 by Stephanie Howard RN  Outcome: Ongoing  Note: Vitals signs are stable.   Intake and output are being recorded, will continue to monitor

## 2020-04-05 NOTE — CARE COORDINATION
4/5 Patient will discharge to home via [place] [id number] services via stretcher at 4:45 PM. ([phone]). Son, Bernadette Mckinney has been notified. ([phone].  Electronically signed by Manju Flores RN on 4/5/2020 at 3:54 PM

## 2020-04-05 NOTE — CARE COORDINATION
Discharge Planning:  SW contacted [facility] inform this agency that this pt is being discharged today and will need a hospital bed delivered. Chyrl Overcast requires the assistance of a [facility] Bed to make frequent and immediate changes of body positions not feasible with an ordinary bed to alleviate pain. Patient needs bed height requirements to perform patient transfers, grooming, and daily living tasks. The patient requires the head of the bed to be elevated more than 30 degrees most of the time, due to patient's diagnosis. Pillows and wedges have been considered and ruled out.       Electronically signed by Jason Ortega on 4/5/2020 at 10:52 AM

## 2020-04-05 NOTE — CARE COORDINATION
Discharge Planning:  SW received the orders for d/c.  LAKSHMI contacted Daralene Bamberger at [facility] to inform his that this pt will be going home today. Daralene Bamberger stated that he will watch for the orders and will pull the orders once they have been completed. LAKSHMI contacted pts son Daniel An. Daniel An confirmed that this pt will need the hospital bed and requested that the hospital bed be delivered prior to pt returning home. LAKSHMI contacted [facility] and spoke with Vivienne Negro. LAKSHMI faxed the required documentation and DME order to Vivienne Negro at [phone]. Vivienne Negro stated that she will find out the delivery time and will get back with this SW.  Electronically signed by Evelina Hannon on 4/5/2020 at 11:24 AM       Addendum:  LAKSHMI spoke with Vivienne Negro who conformed that the hospital bed was delivered between 2pm and 3pm and that the oxygen was not covered under pts insurance as pt did not meet the dx criteria. Vivienne Negro talked with pts son and son agreed to pay privately for the oxygen.   Electronically signed by eÃ“tica on 4/5/2020 at 4:26 PM

## 2020-04-05 NOTE — DISCHARGE SUMMARY
Peripheral Pulses: +2 palpable, equal bilaterally      Pertinent Studies During Hospital Stay:    Radiology:  Xr Chest Portable    Result Date: 4/1/2020  EXAMINATION: ONE XRAY VIEW OF THE CHEST 4/1/2020 4:16 pm COMPARISON: 10/04/2016 HISTORY: ORDERING SYSTEM PROVIDED HISTORY: Shortness of breath TECHNOLOGIST PROVIDED HISTORY: Reason for exam:->Shortness of breath Reason for Exam:  SOB for past two days. No fever, no cough Acuity: Chronic Type of Exam: Ongoing FINDINGS: Mediastinal silhouette is prominent. New bilateral pleural effusions. Basilar pulmonary opacities are present with pulmonary vascular congestion. Multiple nonacute appearing right rib fractures are present. These were not present on the prior study referenced above. Posttraumatic changes are seen about the right shoulder. Pleural effusions are present with basilar pulmonary opacities and pulmonary vascular congestion. These findings are suggestive of pulmonary edema. Superimposed pneumonia is not excluded. Multiple nonacute appearing right-sided rib fractures. Evidence for prior injury to the right shoulder.        Last Labs on Discharge:     Recent Results (from the past 24 hour(s))   CBC    Collection Time: 04/05/20  5:28 AM   Result Value Ref Range    WBC 9.9 4.0 - 11.0 K/uL    RBC 5.09 4.00 - 5.20 M/uL    Hemoglobin 12.6 12.0 - 16.0 g/dL    Hematocrit 39.6 36.0 - 48.0 %    MCV 77.7 (L) 80.0 - 100.0 fL    MCH 24.8 (L) 26.0 - 34.0 pg    MCHC 31.9 31.0 - 36.0 g/dL    RDW 22.1 (H) 12.4 - 15.4 %    Platelets 483 741 - 178 K/uL    MPV 9.6 5.0 - 10.5 fL    PLATELET SLIDE REVIEW Adequate     SLIDE REVIEW see below    Magnesium    Collection Time: 04/05/20  5:28 AM   Result Value Ref Range    Magnesium 2.00 1.80 - 2.40 mg/dL   Renal Function Panel    Collection Time: 04/05/20  5:28 AM   Result Value Ref Range    Sodium 144 136 - 145 mmol/L    Potassium 4.9 3.5 - 5.1 mmol/L    Chloride 99 99 - 110 mmol/L    CO2 32 21 - 32 mmol/L    Anion Gap 13 3

## 2020-04-05 NOTE — PROGRESS NOTES
Pink, warm, dry. Nails without clubbing. HEENT: PERRLA. Normocephalic, atraumatic. Neck supple. No adenopathy. LUNG: AP diameter normal. Crackles bilateral bases. Respiratory effort normal.  HEART: S1S2 A/R. No JVD. No carotid bruit. No murmur, rub or gallop. ABD: Soft, nontender. +BS X 4 quads. No hepatomegaly. EXT: Radial and pedal pulses 2+ and symmetric. Without varicosities. No edema. MUSCSKEL: Good ROM X4 extremities. No deformity. NEURO: A/O X3. Calm and cooperative. Medications:    furosemide  40 mg Intravenous Once    potassium chloride  20 mEq Oral BID    aspirin  162 mg Oral Daily    atorvastatin  40 mg Oral Daily    metoclopramide  10 mg Oral BID    pantoprazole  40 mg Oral Daily    sodium chloride flush  10 mL Intravenous 2 times per day    cefTRIAXone (ROCEPHIN) IV  1 g Intravenous Q24H    enoxaparin  30 mg Subcutaneous Daily    anagrelide  0.5 mg Oral Q MWF    anagrelide  1 mg Oral Once per day on Sun Tue Thu Sat       sodium chloride flush, acetaminophen **OR** acetaminophen, polyethylene glycol, promethazine **OR** ondansetron, perflutren lipid microspheres, albuterol    Lab Data: I have reviewed all labs below today.    CBC:   Recent Labs     04/03/20 0557 04/04/20  0505 04/05/20  0528   WBC 7.6 9.3 9.9   HGB 10.4* 11.3* 12.6   HCT 32.0* 35.8* 39.6   MCV 77.0* 78.3* 77.7*    237 291     BMP:   Recent Labs     04/03/20  0557 04/04/20  0505 04/05/20  0528    143 144   K 3.7 4.5 4.9    102 99   CO2 28 28 32   PHOS 3.8 3.8 3.7   BUN 20 16 21*   CREATININE 1.0 0.9 0.8     GLUCOSE:   Recent Labs     04/03/20  0557 04/04/20  0505 04/05/20  0528   GLUCOSE 83 86 85     LIVER PROFILE:   Lab Results   Component Value Date    AST 39 04/01/2020    ALT 39 04/01/2020    LABALBU 3.1 04/05/2020    BILIDIR <0.2 04/01/2020    BILITOT 0.5 04/01/2020    ALKPHOS 127 04/01/2020     PT/INR: No results found for: PROTIME, INR  APTT: No results found for: APTT  Pro-BNP:    Lab Results   Component Value Date    PROBNP 14,975 04/04/2020    PROBNP 27,082 04/01/2020     ENZYMES:  Lab Results   Component Value Date    CKTOTAL 121 04/07/2014    TROPONINI 0.03 04/01/2020   Results for Elio Perdomo (MRN [id number]) as of 4/4/2020 10:25   Ref. Range 4/1/2020 14:52 4/1/2020 20:23   Troponin Latest Ref Range: <0.01 ng/mL 0.02 (H) 0.03 (H)     FASTING LIPID PANEL:  Lab Results   Component Value Date    CHOL 101 04/02/2020    HDL 37 04/02/2020    HDL 57 10/06/2011    LDLCALC 49 04/02/2020    TRIG 77 04/02/2020       Diagnostics:    EKG: Sinus rhythm with Premature atrial complexesabnormal R wave progressionNonspecific T wave abnormalityAbnormal ECGNo previous ECGs availableConfirmed by Keshav, [address] [place] [address] ([id number]) on 4/2/2020 7:55:53 AM    ECHO: 4/2/20   Summary   Overall left ventricular systolic function appears moderately reduced. Ejection fraction is visually estimated to be around 45% with diffuse   hypokinesis. There is mildly increased left ventricular wall thickness. Diastolic filling parameters suggest grade II diastolic dysfunction. The right ventricle appears mildly dilated with normal systolic function. The right atrium is moderately dilated. Moderate tricuspid regurgitation. Mild mitral regurgitation. Estimated pulmonary artery systolic pressure is 36 mmHg plus right atrial   pressure. CXR 4/1/20  Impression   Pleural effusions are present with basilar pulmonary opacities and pulmonary   vascular congestion.  These findings are suggestive of pulmonary edema. Superimposed pneumonia is not excluded.       Multiple nonacute appearing right-sided rib fractures.  Evidence for prior   injury to the right shoulder       Assessment/Plan:  1. ) Acute on chronic combined systolic and diastolic grade II HF: EF 15%. Attempting to wean O2 and discharge today. BNP improved but remains significantly elevated. Weight down 4 lbs. ? I/O accuracy, neg 1130. Extra lasix 40mg IV given today. NYHA Class III  Diuretic: lasix 40mg IV today, may do better with torsmide 20mg po daily at discharge. Beta Blocker: Not indicated for EF > 35%  ACEi/ARB/ARNI: Notnindicated for EF > 35%. Aldosterone Antagonist: Consider adding if creat remains stable with diuresis  Cardiac Rehab: Not indicated for EF > 35%  2gm Na diet, daily weight, 64 oz fluid restriction  Avoid NSAIDS  ICD: Not indicated for EF > 35%    2.) Elevated troponin: Flat, no evidence of angina/ischemia. Most likely from HF.     3.) Hypertension: BP up and down, low upon admit, continue to monitor, may need to add antihypertensive with remains elevated. Goal BP <130/80. Non pharmacologic interventions include:   -weight loss  -heart healthy low sodium and low fat diet that consist of mostly fruits, vegetables and grains (Dash diet)  -limited amount of alcohol (no more than 1 drink/day for women, 2 drinks/day for men)  -regular physical activity  -no smoking  -stress reduction    4. ) PAD:   -Stable, cont aspirin, statin    Plan for discharge today. Attempting to wean O2. She is scheduled to follow up with  tomorrow with virtual visit D/T COVID 19 restrictions.      Electronically signed by FATOUMATA Huynh CNP on 4/5/2020 at 8:06 AM

## 2020-04-06 ENCOUNTER — CARE COORDINATION (OUTPATIENT)
Dept: CASE MANAGEMENT | Age: 85
End: 2020-04-06

## 2020-04-06 ENCOUNTER — VIRTUAL VISIT (OUTPATIENT)
Dept: CARDIOLOGY CLINIC | Age: 85
End: 2020-04-06
Payer: MEDICARE

## 2020-04-06 VITALS — HEIGHT: 60 IN | BODY MASS INDEX: 17.05 KG/M2

## 2020-04-06 PROCEDURE — 99214 OFFICE O/P EST MOD 30 MIN: CPT | Performed by: INTERNAL MEDICINE

## 2020-04-06 NOTE — PROGRESS NOTES
Stas Valle MD   atorvastatin (LIPITOR) 40 MG tablet TAKE 1 TABLET BY MOUTH DAILY Yes Figueroa Heredia MD   pantoprazole (PROTONIX) 40 MG tablet TAKE 1 TABLET BY MOUTH DAILY Yes Loren Garcia, APRN - CNP   anagrelide (AGRYLIN) 0.5 MG capsule TAKE ONE CAPSULE BY MOUTH ON MONDAY, WEDNESDAY, AND FRIDAY AND [id number] [facility], [facility], Justin Barron MD   aspirin 81 MG tablet Take 162 mg by mouth.  Yes Historical Provider, MD       Social History     Tobacco Use    Smoking status: Former Smoker     Types: Cigarettes     Last attempt to quit: 2019     Years since quittin.1    Smokeless tobacco: Never Used   Substance Use Topics    Alcohol use: No    Drug use: No      Allergies   Allergen Reactions    Codeine    ,   Past Medical History:   Diagnosis Date    CHF (congestive heart failure) (HCC)     Claudication of left lower extremity ([address] 2014    COPD (chronic obstructive pulmonary disease) (HCC)     Depression     GERD (gastroesophageal reflux disease)     Hyperlipidemia     Hypertension     Osteoporosis     Peripheral vascular disease (HCC)     s/p stent bilat legs   ,   Past Surgical History:   Procedure Laterality Date    APPENDECTOMY      BREAST SURGERY      biopsy, left    CHOLECYSTECTOMY      IR FEMORAL POPLITEAL BYPASS GRAFT      Femoral Femoral Crossover bypass/ Dr Rodriguez Scherer     ,   Social History     Tobacco Use    Smoking status: Former Smoker     Types: Cigarettes     Last attempt to quit: 2019     Years since quittin.1    Smokeless tobacco: Never Used   Substance Use Topics    Alcohol use: No    Drug use: No   ,   Family History   Problem Relation Age of Onset    Heart Disease Mother 76        mi    Heart Disease Father         MI     Vital Signs: (As obtained by patient/caregiver or practitioner observation)    Ht 5' (1.524 m)   BMI 17.05 kg/m²     Physical Exam:   Could not perform physical Vitals/Constitutional/EENT/Resp/CV/GI//MS/Neuro/Skin/Heme-Lymph-Imm. Pursuant to the emergency declaration under the [address] [place] [address] and the [employer] and [employer] Act, this Virtual Visit was conducted with patient's (and/or legal guardian's) consent, to reduce the patient's risk of exposure to COVID-19 and provide necessary medical care. The patient (and/or legal guardian) has also been advised to contact this office for worsening conditions or problems, and seek emergency medical treatment and/or call 911 if deemed necessary. Services were provided through a video synchronous discussion virtually to substitute for in-person clinic visit. Patient and provider were located at their individual homes. This note was scribed in the presence of Dr Jessee Arias MD by Dixie Culp RN. Physician Attestation: The scribes documentation has been prepared under my direction and personally reviewed by me in its entirety. I confirm that the note above accurately reflects all work, treatment, procedures, and medical decision making performed by me. All portions of the note including but not limited to the chief complaint, history of present illness, physical exam, assessment and plan/medical decision making were personally reviewed, edited, and updated on the day of the visit.

## 2020-04-06 NOTE — PATIENT INSTRUCTIONS
that you limit sodium. Your doctor can tell you how much sodium is right for you. An example is less than 3,000 mg a day. This includes all the salt you eat in cooking or in packaged foods. People get most of their sodium from processed foods. Fast food and restaurant meals also tend to be very high in sodium.     · Ask your doctor how much liquid you can drink each day. You may have to limit liquids.    Weight    · Weigh yourself without clothing at the same time each day. Record your weight. Call your doctor if you have a sudden weight gain, such as more than 2 to 3 pounds in a day or 5 pounds in a week. (Your doctor may suggest a different range of weight gain.) A sudden weight gain may mean that your heart failure is getting worse.    Activity level    · Start light exercise (if your doctor says it is okay). Even if you can only do a small amount, exercise will help you get stronger, have more energy, and manage your weight and your stress. Walking is an easy way to get exercise. Start out by walking a little more than you did before. Bit by bit, increase the amount you walk.     · When you exercise, watch for signs that your heart is working too hard. You are pushing yourself too hard if you cannot talk while you are exercising. If you become short of breath or dizzy or have chest pain, stop, sit down, and rest.     · If you feel \"wiped out\" the day after you exercise, walk slower or for a shorter distance until you can work up to a better pace.     · Get enough rest at night. Sleeping with 1 or 2 pillows under your upper body and head may help you breathe easier.    Lifestyle changes    · Do not smoke. Smoking can make a heart condition worse. If you need help quitting, talk to your doctor about stop-smoking programs and medicines. These can increase your chances of quitting for good.  Quitting smoking may be the most important step you can take to protect your heart.     · Limit alcohol to 2 drinks a day for men and 1 drink a day for women. Too much alcohol can cause health problems.     · Avoid getting sick from colds and the flu. Get a pneumococcal vaccine shot. If you have had one before, ask your doctor whether you need another dose. Get a flu shot each year. If you must be around people with colds or the flu, wash your hands often. When should you call for help? Call 911 if you have symptoms of sudden heart failure such as:    · You have severe trouble breathing.     · You cough up pink, foamy mucus.     · You have a new irregular or rapid heartbeat.    Call your doctor now or seek immediate medical care if:    · You have new or increased shortness of breath.     · You are dizzy or lightheaded, or you feel like you may faint.     · You have sudden weight gain, such as more than 2 to 3 pounds in a day or 5 pounds in a week. (Your doctor may suggest a different range of weight gain.)     · You have increased swelling in your legs, ankles, or feet.     · You are suddenly so tired or weak that you cannot do your usual activities.    Watch closely for changes in your health, and be sure to contact your doctor if you develop new symptoms. Where can you learn more? Go to [email or web address] and sign in to your [employer] account. Enter Y401 in the [employer] box to learn more about \"Heart Failure: Care Instructions. \"     If you do not have an account, please click on the \"Sign Up Now\" link. Current as of: December 15, 2019Content Version: 12.4  © 7197-0178 Healthwise, Incorporated. Care instructions adapted under license by [facility] ([facility]). If you have questions about a medical condition or this instruction, always ask your healthcare professional. [place] [address] any warranty or liability for your use of this information.

## 2020-04-07 ENCOUNTER — TELEPHONE (OUTPATIENT)
Dept: PHARMACY | Facility: CLINIC | Age: 85
End: 2020-04-07

## 2020-04-07 ENCOUNTER — CARE COORDINATION (OUTPATIENT)
Dept: CASE MANAGEMENT | Age: 85
End: 2020-04-07

## 2020-04-07 PROCEDURE — 1111F DSCHRG MED/CURRENT MED MERGE: CPT

## 2020-04-07 NOTE — TELEPHONE ENCOUNTER
CLINICAL PHARMACY NOTE  Post-Discharge Transitions of Care ([facility])    Attempted to reach patient for transitions of care follow-up after discharge from [facility] on 4/5/2020. Left voicemail for patient/son to return phone call. Will attempt again tomorrow.      Argelia Everett PharmD, [facility]  Direct: [phone]  Department, toll free [phone], option 7

## 2020-04-08 ENCOUNTER — TELEPHONE (OUTPATIENT)
Dept: INTERNAL MEDICINE CLINIC | Age: 85
End: 2020-04-08

## 2020-04-08 ENCOUNTER — VIRTUAL VISIT (OUTPATIENT)
Dept: INTERNAL MEDICINE CLINIC | Age: 85
End: 2020-04-08
Payer: MEDICARE

## 2020-04-08 ENCOUNTER — CARE COORDINATION (OUTPATIENT)
Dept: CASE MANAGEMENT | Age: 85
End: 2020-04-08

## 2020-04-08 PROCEDURE — 99442 PR PHYS/QHP TELEPHONE EVALUATION 11-20 MIN: CPT | Performed by: INTERNAL MEDICINE

## 2020-04-08 NOTE — PROGRESS NOTES
within my department in the past 7 days or scheduled within the next 24 hours.     Total Time: minutes: 11-20 minutes    Note: not billable if this call serves to triage the patient into an appointment for the relevant concern    Recheck 1 month for f/u on [facility]

## 2020-04-13 ENCOUNTER — TELEPHONE (OUTPATIENT)
Dept: INTERNAL MEDICINE CLINIC | Age: 85
End: 2020-04-13

## 2020-04-13 NOTE — TELEPHONE ENCOUNTER
Only new med seen on MAR is toresemide, is this correct? Also, does patient take any OTC for constipation that is not on MAR? Any abdominal pain?

## 2020-04-13 NOTE — TELEPHONE ENCOUNTER
Qiana Ornelas noted yes torsemide is the only new medication noted. No she does not take any medication for constipation. And no abd pain noted.

## 2020-04-13 NOTE — TELEPHONE ENCOUNTER
Please inform him patient may take mirlax 17 mg (1 scoop daily). This is OTC but can be ordered if needed. Ensure that  patient urinating per usual and drinking enough fluids, also, encourage high fiber diet.  Please inform patient to call if symptoms continue

## 2020-04-14 ENCOUNTER — TELEPHONE (OUTPATIENT)
Dept: INTERNAL MEDICINE CLINIC | Age: 85
End: 2020-04-14

## 2020-04-22 ENCOUNTER — CARE COORDINATION (OUTPATIENT)
Dept: CASE MANAGEMENT | Age: 85
End: 2020-04-22

## 2020-05-11 ENCOUNTER — VIRTUAL VISIT (OUTPATIENT)
Dept: INTERNAL MEDICINE CLINIC | Age: 85
End: 2020-05-11
Payer: MEDICARE

## 2020-05-11 ENCOUNTER — TELEPHONE (OUTPATIENT)
Dept: INTERNAL MEDICINE CLINIC | Age: 85
End: 2020-05-11

## 2020-05-11 PROCEDURE — 99441 PR PHYS/QHP TELEPHONE EVALUATION 5-10 MIN: CPT | Performed by: INTERNAL MEDICINE

## 2020-05-11 ASSESSMENT — ENCOUNTER SYMPTOMS
DIFFICULTY BREATHING: 0
WHEEZING: 0
HEARTBURN: 0
CHOKING: 0
CHEST TIGHTNESS: 0
SPUTUM PRODUCTION: 0
ORTHOPNEA: 0
ABDOMINAL PAIN: 0
BLURRED VISION: 0
HOARSE VOICE: 0
BELCHING: 0
GLOBUS SENSATION: 0
FREQUENT THROAT CLEARING: 0
COUGH: 0
NAUSEA: 0
SORE THROAT: 0
SHORTNESS OF BREATH: 0

## 2020-05-11 ASSESSMENT — COPD QUESTIONNAIRES: COPD: 1

## 2020-05-11 NOTE — PROGRESS NOTES
garbled speech. Psychiatric: She has a normal mood and affect. Her behavior is normal. Judgment and thought content normal.       Assessment:      1. Acute on chronic combined systolic (congestive) and diastolic (congestive) heart failure ([address]    2. Essential hypertension    3. Mixed hyperlipidemia    4. Gastroesophageal reflux disease, esophagitis presence not specified    5. Stage 3 chronic kidney disease ([address]    6. Simple chronic bronchitis ([address]    7. Essential thrombocytosis (HCC)    8. Bruit of right carotid artery    9. PAD (peripheral artery disease) ([address]             Plan:      Diagnoses and all orders for this visit:    Acute on chronic combined systolic (congestive) and diastolic (congestive) heart failure ([address]):  Stable, cont same meds    Essential hypertension:  Stable, cont same meds    Mixed hyperlipidemia:  Stable, cont same meds    Gastroesophageal reflux disease, esophagitis presence not specified:  D/c reglan in effort to improve dysarthria. Call for problems      Stage 3 chronic kidney disease [facility]):  monitor    Simple chronic bronchitis ([facility]):  Stable, cont same meds    Essential thrombocytosis ([facility]):  Stable, cont same meds    Bruit of right carotid artery:  Refusing study    PAD (peripheral artery disease) ([facility]):  Stable, cont same meds    6 wks   Raleigh Neal is a 80 y.o. female evaluated via telephone on 5/11/2020. Consent:  She and/or health care decision maker is aware that that she may receive a bill for this telephone service, depending on her insurance coverage, and has provided verbal consent to proceed: Yes      Documentation:  I communicated with the patient and/or health care decision maker about SEE ABOVE.    Details of this discussion including any medical advice provided: SEE ABOVE      I affirm this is a Patient Initiated Episode with a Patient who has not had a related appointment within my department in the past 7 days or scheduled within the next 24

## 2020-05-11 NOTE — TELEPHONE ENCOUNTER
Sharmaine De La Rosa MD  [facility] respiratory company to eval pt for continued need for o2.  Family doesn't think she needs it. Robert Castro - referring to this message:    Should we contact the , care coordinator, or which facility would you like to use?     Please advise and send to pool

## 2020-05-13 ENCOUNTER — CARE COORDINATION (OUTPATIENT)
Dept: CARE COORDINATION | Age: 85
End: 2020-05-13

## 2020-05-13 NOTE — CARE COORDINATION
Per note as seen below from [facility] SN visit on [id number]    TOOK O2 OFF to walk to bathroom, when she returned her O2 sat was 90 % on RA, , after 5 minutes, placed back on O2 2 lpm, O2 sats went up to 98% , HR 92

## 2020-05-13 NOTE — TELEPHONE ENCOUNTER
I emailed Kim  nursing documentation to PM and cc DR Marissa Spencer in email  Re home o2 sats on RA with ambulation.

## 2020-05-13 NOTE — TELEPHONE ENCOUNTER
See care coordination note, I did my best in light of covid crisis to ascertain O2 sats with ambulation  The cc note has Nurse note from Home visit on 5/12/20.   Any questions please call at [phone]

## 2020-05-18 ENCOUNTER — TELEPHONE (OUTPATIENT)
Dept: INTERNAL MEDICINE CLINIC | Age: 85
End: 2020-05-18

## 2020-06-17 ENCOUNTER — TELEPHONE (OUTPATIENT)
Dept: INTERNAL MEDICINE CLINIC | Age: 85
End: 2020-06-17

## 2020-06-17 RX ORDER — TORSEMIDE 20 MG/1
20 TABLET ORAL DAILY
Qty: 90 TABLET | Refills: 3 | Status: SHIPPED | OUTPATIENT
Start: 2020-06-17 | End: 2021-05-12

## 2020-06-17 NOTE — TELEPHONE ENCOUNTER
Pharmacy can't refill torsemide . Should patient go back to furosemide? Please call in torsemide if you want her to continue with that. She has plenty of furosemide.     Rebecca

## 2020-07-01 ENCOUNTER — OFFICE VISIT (OUTPATIENT)
Dept: INTERNAL MEDICINE CLINIC | Age: 85
End: 2020-07-01
Payer: MEDICARE

## 2020-07-01 VITALS
DIASTOLIC BLOOD PRESSURE: 81 MMHG | WEIGHT: 76 LBS | RESPIRATION RATE: 14 BRPM | OXYGEN SATURATION: 95 % | BODY MASS INDEX: 14.84 KG/M2 | HEART RATE: 86 BPM | SYSTOLIC BLOOD PRESSURE: 139 MMHG | TEMPERATURE: 98.6 F

## 2020-07-01 LAB
A/G RATIO: 1.3 (ref 1.1–2.2)
ALBUMIN SERPL-MCNC: 4 G/DL (ref 3.4–5)
ALP BLD-CCNC: 72 U/L (ref 40–129)
ALT SERPL-CCNC: 13 U/L (ref 10–40)
ANION GAP SERPL CALCULATED.3IONS-SCNC: 15 MMOL/L (ref 3–16)
AST SERPL-CCNC: 21 U/L (ref 15–37)
BASOPHILS ABSOLUTE: 0 K/UL (ref 0–0.2)
BASOPHILS RELATIVE PERCENT: 0.5 %
BILIRUB SERPL-MCNC: 0.3 MG/DL (ref 0–1)
BUN BLDV-MCNC: 23 MG/DL (ref 7–20)
C-REACTIVE PROTEIN: 0.7 MG/L (ref 0–5.1)
CALCIUM SERPL-MCNC: 9 MG/DL (ref 8.3–10.6)
CHLORIDE BLD-SCNC: 100 MMOL/L (ref 99–110)
CO2: 26 MMOL/L (ref 21–32)
CREAT SERPL-MCNC: 0.8 MG/DL (ref 0.6–1.2)
EOSINOPHILS ABSOLUTE: 0.4 K/UL (ref 0–0.6)
EOSINOPHILS RELATIVE PERCENT: 4.2 %
GFR AFRICAN AMERICAN: >60
GFR NON-AFRICAN AMERICAN: >60
GLOBULIN: 3 G/DL
GLUCOSE BLD-MCNC: 84 MG/DL (ref 70–99)
HCT VFR BLD CALC: 48.3 % (ref 36–48)
HEMOGLOBIN: 15.8 G/DL (ref 12–16)
LYMPHOCYTES ABSOLUTE: 1.4 K/UL (ref 1–5.1)
LYMPHOCYTES RELATIVE PERCENT: 15.9 %
MCH RBC QN AUTO: 27.1 PG (ref 26–34)
MCHC RBC AUTO-ENTMCNC: 32.6 G/DL (ref 31–36)
MCV RBC AUTO: 83.3 FL (ref 80–100)
MONOCYTES ABSOLUTE: 0.6 K/UL (ref 0–1.3)
MONOCYTES RELATIVE PERCENT: 6.1 %
NEUTROPHILS ABSOLUTE: 6.7 K/UL (ref 1.7–7.7)
NEUTROPHILS RELATIVE PERCENT: 73.3 %
PDW BLD-RTO: 18.3 % (ref 12.4–15.4)
PLATELET # BLD: 333 K/UL (ref 135–450)
PLATELET SLIDE REVIEW: ADEQUATE
PMV BLD AUTO: 9.4 FL (ref 5–10.5)
POTASSIUM SERPL-SCNC: 3.6 MMOL/L (ref 3.5–5.1)
RBC # BLD: 5.81 M/UL (ref 4–5.2)
SODIUM BLD-SCNC: 141 MMOL/L (ref 136–145)
TOTAL PROTEIN: 7 G/DL (ref 6.4–8.2)
TSH REFLEX: 1.28 UIU/ML (ref 0.27–4.2)
WBC # BLD: 9.1 K/UL (ref 4–11)

## 2020-07-01 PROCEDURE — 1090F PRES/ABSN URINE INCON ASSESS: CPT | Performed by: INTERNAL MEDICINE

## 2020-07-01 PROCEDURE — G8399 PT W/DXA RESULTS DOCUMENT: HCPCS | Performed by: INTERNAL MEDICINE

## 2020-07-01 PROCEDURE — 1123F ACP DISCUSS/DSCN MKR DOCD: CPT | Performed by: INTERNAL MEDICINE

## 2020-07-01 PROCEDURE — 36415 COLL VENOUS BLD VENIPUNCTURE: CPT | Performed by: INTERNAL MEDICINE

## 2020-07-01 PROCEDURE — G8419 CALC BMI OUT NRM PARAM NOF/U: HCPCS | Performed by: INTERNAL MEDICINE

## 2020-07-01 PROCEDURE — 3023F SPIROM DOC REV: CPT | Performed by: INTERNAL MEDICINE

## 2020-07-01 PROCEDURE — 1036F TOBACCO NON-USER: CPT | Performed by: INTERNAL MEDICINE

## 2020-07-01 PROCEDURE — 99214 OFFICE O/P EST MOD 30 MIN: CPT | Performed by: INTERNAL MEDICINE

## 2020-07-01 PROCEDURE — G8427 DOCREV CUR MEDS BY ELIG CLIN: HCPCS | Performed by: INTERNAL MEDICINE

## 2020-07-01 PROCEDURE — G8926 SPIRO NO PERF OR DOC: HCPCS | Performed by: INTERNAL MEDICINE

## 2020-07-01 PROCEDURE — 4040F PNEUMOC VAC/ADMIN/RCVD: CPT | Performed by: INTERNAL MEDICINE

## 2020-07-01 SDOH — ECONOMIC STABILITY: FOOD INSECURITY: WITHIN THE PAST 12 MONTHS, YOU WORRIED THAT YOUR FOOD WOULD RUN OUT BEFORE YOU GOT MONEY TO BUY MORE.: NEVER TRUE

## 2020-07-01 SDOH — ECONOMIC STABILITY: FOOD INSECURITY: WITHIN THE PAST 12 MONTHS, THE FOOD YOU BOUGHT JUST DIDN'T LAST AND YOU DIDN'T HAVE MONEY TO GET MORE.: NEVER TRUE

## 2020-07-01 SDOH — ECONOMIC STABILITY: INCOME INSECURITY: HOW HARD IS IT FOR YOU TO PAY FOR THE VERY BASICS LIKE FOOD, HOUSING, MEDICAL CARE, AND HEATING?: NOT HARD AT ALL

## 2020-07-01 SDOH — ECONOMIC STABILITY: TRANSPORTATION INSECURITY
IN THE PAST 12 MONTHS, HAS LACK OF TRANSPORTATION KEPT YOU FROM MEETINGS, WORK, OR FROM GETTING THINGS NEEDED FOR DAILY LIVING?: NO

## 2020-07-01 SDOH — ECONOMIC STABILITY: TRANSPORTATION INSECURITY
IN THE PAST 12 MONTHS, HAS THE LACK OF TRANSPORTATION KEPT YOU FROM MEDICAL APPOINTMENTS OR FROM GETTING MEDICATIONS?: NO

## 2020-07-01 ASSESSMENT — ENCOUNTER SYMPTOMS
SORE THROAT: 0
CHOKING: 0
CHEST TIGHTNESS: 0
SHORTNESS OF BREATH: 0
GLOBUS SENSATION: 0
HOARSE VOICE: 0
SPUTUM PRODUCTION: 0
HEARTBURN: 0
FREQUENT THROAT CLEARING: 0
ABDOMINAL PAIN: 0
COUGH: 0
BLURRED VISION: 0
BELCHING: 0
ORTHOPNEA: 0
DIFFICULTY BREATHING: 0
WHEEZING: 0
NAUSEA: 0

## 2020-07-01 ASSESSMENT — PATIENT HEALTH QUESTIONNAIRE - PHQ9
SUM OF ALL RESPONSES TO PHQ9 QUESTIONS 1 & 2: 0
SUM OF ALL RESPONSES TO PHQ QUESTIONS 1-9: 0
2. FEELING DOWN, DEPRESSED OR HOPELESS: 0
1. LITTLE INTEREST OR PLEASURE IN DOING THINGS: 0
SUM OF ALL RESPONSES TO PHQ QUESTIONS 1-9: 0

## 2020-07-01 ASSESSMENT — COPD QUESTIONNAIRES: COPD: 1

## 2020-07-01 NOTE — PROGRESS NOTES
Subjective:    Here for recheck of htn, hld, gerd, thrombocytosis, chf, pvd and copd  Patient ID: Mary Nava is a 80 y.o. female. Hypertension   This is a chronic problem. The current episode started more than 1 year ago. The problem is unchanged. The problem is controlled. Pertinent negatives include no blurred vision, chest pain, headaches, malaise/fatigue, neck pain, orthopnea, palpitations, peripheral edema, PND, shortness of breath or sweats. There are no associated agents to hypertension. Risk factors for coronary artery disease include dyslipidemia, smoking/tobacco exposure and post-menopausal state (quit smoking. ). Past treatments include diuretics. The current treatment provides significant improvement. There are no compliance problems. Hypertensive end-organ damage includes PVD. Hyperlipidemia   This is a chronic problem. The current episode started more than 1 year ago. The problem is controlled. Recent lipid tests were reviewed and are normal. Factors aggravating her hyperlipidemia include smoking. Pertinent negatives include no chest pain, focal sensory loss, focal weakness, leg pain, myalgias or shortness of breath. Current antihyperlipidemic treatment includes statins. The current treatment provides significant improvement of lipids. There are no compliance problems. Risk factors for coronary artery disease include hypertension, dyslipidemia and post-menopausal.   Gastroesophageal Reflux   She reports no abdominal pain, no belching, no chest pain, no choking, no coughing, no dysphagia, no early satiety, no globus sensation, no heartburn, no hoarse voice, no nausea, no sore throat or no wheezing. This is a chronic problem. The current episode started more than 1 year ago. The problem occurs rarely. The problem has been unchanged. Nothing aggravates the symptoms. Pertinent negatives include no anemia, fatigue, melena, muscle weakness, orthopnea or weight loss.  There are no known risk melatonin. Family notes garbled speech and weakness on right side. Pt refusing any eval.  Family concerned about wt loss. Is eating well. Review of Systems   Constitutional: Negative for appetite change, fatigue, fever, malaise/fatigue, unexpected weight change and weight loss. HENT: Negative for hoarse voice and sore throat. Eyes: Negative for blurred vision. Respiratory: Negative for cough, sputum production, choking, shortness of breath and wheezing. Cardiovascular: Negative for chest pain, dyspnea on exertion, palpitations, orthopnea, leg swelling and PND. Gastrointestinal: Negative for abdominal pain, dysphagia, heartburn, melena and nausea. Musculoskeletal: Negative for myalgias, muscle weakness and neck pain. Neurological: Positive for speech difficulty and weakness. Negative for dizziness, tremors, focal weakness, syncope, facial asymmetry, light-headedness and headaches. Prior to Visit Medications    Medication Sig Taking?  Authorizing Provider   torsemide (DEMADEX) 20 MG tablet Take 1 tablet by mouth daily Yes Ruthie Reagan MD   albuterol sulfate  (90 Base) MCG/ACT inhaler INHALE 2 PUFFS BY MOUTH FOUR TIMES DAILY AS NEEDED FOR SHORTNESS OF BREATH Yes Ruthie Reagan MD   FEROSUL 325 (65 Fe) MG tablet TAKE 1 TABLET BY MOUTH DAILY Yes FATOUMATA Rodriguez CNP   pantoprazole (PROTONIX) 40 MG tablet TAKE 1 TABLET BY MOUTH DAILY Yes FATOUMATA Paz - CNP   anagrelide (AGRYLIN) 0.5 MG capsule TAKE ONE CAPSULE BY MOUTH ON MONDAY, WEDNESDAY, AND FRIDAY AND 2 [id number] [facility], [address] [facility], John Davies MD   aspirin 81 MG tablet Take 162 mg by mouth daily  Yes Historical Provider, MD   atorvastatin (LIPITOR) 40 MG tablet TAKE 1 TABLET BY MOUTH DAILY  QUINTIN Gu MD         /81 (Site: Right Upper Arm, Position: Sitting, Cuff Size: Child)   Pulse 86   Temp 98.6 °F (37 °C)   Resp 14   Wt 76 lb (34.5 kg)   SpO2 95%   Breastfeeding No   BMI 14.84 kg/m²       Objective:   Physical Exam   Constitutional: She is oriented to person, place, and time. She appears well-developed and well-nourished. No distress. Neck: No JVD present.   + right bruit. Cardiovascular: Normal rate, S1 normal, S2 normal and normal heart sounds. A regularly irregular rhythm present. Exam reveals no gallop and no friction rub. No murmur heard. Pulmonary/Chest: Effort normal and breath sounds normal. No respiratory distress. She has no wheezes. She has no rales. Musculoskeletal:         General: No edema. Neurological: She is alert and oriented to person, place, and time. She has normal reflexes. No cranial nerve deficit. She exhibits abnormal muscle tone. Garbled speech   Skin: Skin is warm and dry. She is not diaphoretic. No erythema. Psychiatric: She has a normal mood and affect. Her behavior is normal. Judgment and thought content normal.   Vitals reviewed. Assessment:      1. Essential hypertension    2. Mixed hyperlipidemia    3. Gastroesophageal reflux disease, esophagitis presence not specified    4. Stage 3 chronic kidney disease ([address]    5. Simple chronic bronchitis ([address]    6. Essential thrombocytosis (HCC)    7. Bruit of right carotid artery    8. PAD (peripheral artery disease) ([address]    9. Chronic systolic congestive heart failure ([address]    10. Hypoalbuminemia due to protein-calorie malnutrition ([address]    11. Microcytic anemia    12. Primary insomnia    13. Weight loss    14. Garbled speech             Plan:      Bart Santacruz was seen today for hypertension, chronic kidney disease and other.     Diagnoses and all orders for this visit:    Essential hypertension:  Stable, cont same meds    Mixed hyperlipidemia:  Stable, cont same meds    Gastroesophageal reflux disease, esophagitis presence not specified:  Stable, cont same meds    Stage 3 chronic kidney disease [facility]):  Check labs  -     Comprehensive Metabolic

## 2020-07-02 LAB — SEDIMENTATION RATE, ERYTHROCYTE: 10 MM/HR (ref 0–30)

## 2020-07-31 ENCOUNTER — TELEPHONE (OUTPATIENT)
Dept: INTERNAL MEDICINE CLINIC | Age: 85
End: 2020-07-31

## 2020-08-13 ENCOUNTER — TELEPHONE (OUTPATIENT)
Dept: INTERNAL MEDICINE CLINIC | Age: 85
End: 2020-08-13

## 2020-10-28 RX ORDER — PANTOPRAZOLE SODIUM 40 MG/1
40 TABLET, DELAYED RELEASE ORAL DAILY
Qty: 90 TABLET | Refills: 0 | Status: SHIPPED | OUTPATIENT
Start: 2020-10-28 | End: 2021-01-25

## 2020-10-28 NOTE — TELEPHONE ENCOUNTER
lov 7/1/20        Future Appointments   Date Time Provider Vira Cummins   1/4/2021  1:00 PM Fredi Babb MD [facility]

## 2020-11-16 ENCOUNTER — TELEPHONE (OUTPATIENT)
Dept: INTERNAL MEDICINE CLINIC | Age: 85
End: 2020-11-16

## 2020-11-16 NOTE — TELEPHONE ENCOUNTER
Pts son called. Worried that pt has a uti. States that the pt is not complaining of anything, but there was blood in pts depends. Pt is noting that there is discomfort off and on in her rear. Son states pt is unable to come in to be seen.     Please advise

## 2020-11-16 NOTE — TELEPHONE ENCOUNTER
Brock Britton called back and he will drop off urine sample as soon as he can obtain one from patient.  Orders placed for poct u/a and urine culture

## 2020-11-17 ENCOUNTER — NURSE ONLY (OUTPATIENT)
Dept: INTERNAL MEDICINE CLINIC | Age: 85
End: 2020-11-17
Payer: MEDICARE

## 2020-11-17 LAB
BILIRUBIN, POC: ABNORMAL
BLOOD URINE, POC: ABNORMAL
CLARITY, POC: ABNORMAL
COLOR, POC: ABNORMAL
GLUCOSE URINE, POC: ABNORMAL
KETONES, POC: ABNORMAL
LEUKOCYTE EST, POC: ABNORMAL
NITRITE, POC: ABNORMAL
PH, POC: 5.5
PROTEIN, POC: ABNORMAL
SPECIFIC GRAVITY, POC: 1.01
UROBILINOGEN, POC: 0.2

## 2020-11-17 PROCEDURE — 81002 URINALYSIS NONAUTO W/O SCOPE: CPT | Performed by: INTERNAL MEDICINE

## 2020-11-18 LAB — URINE CULTURE, ROUTINE: NORMAL

## 2020-11-18 RX ORDER — CIPROFLOXACIN 250 MG/1
250 TABLET, FILM COATED ORAL 2 TIMES DAILY
Qty: 10 TABLET | Refills: 0 | Status: SHIPPED | OUTPATIENT
Start: 2020-11-18 | End: 2020-11-23

## 2020-11-23 ENCOUNTER — TELEPHONE (OUTPATIENT)
Dept: INTERNAL MEDICINE CLINIC | Age: 85
End: 2020-11-23

## 2020-11-23 NOTE — TELEPHONE ENCOUNTER
Needs to be seen. I don't know where the blood is coming from.   Can go to urgent care or come in and see Mary Garza or [facility] tomorrow

## 2020-11-25 ENCOUNTER — OFFICE VISIT (OUTPATIENT)
Dept: INTERNAL MEDICINE CLINIC | Age: 85
End: 2020-11-25
Payer: MEDICARE

## 2020-11-25 VITALS
TEMPERATURE: 97.5 F | DIASTOLIC BLOOD PRESSURE: 62 MMHG | SYSTOLIC BLOOD PRESSURE: 110 MMHG | BODY MASS INDEX: 15.04 KG/M2 | WEIGHT: 77 LBS | HEART RATE: 92 BPM | RESPIRATION RATE: 18 BRPM

## 2020-11-25 PROCEDURE — G8484 FLU IMMUNIZE NO ADMIN: HCPCS | Performed by: NURSE PRACTITIONER

## 2020-11-25 PROCEDURE — 1123F ACP DISCUSS/DSCN MKR DOCD: CPT | Performed by: NURSE PRACTITIONER

## 2020-11-25 PROCEDURE — 1090F PRES/ABSN URINE INCON ASSESS: CPT | Performed by: NURSE PRACTITIONER

## 2020-11-25 PROCEDURE — G8419 CALC BMI OUT NRM PARAM NOF/U: HCPCS | Performed by: NURSE PRACTITIONER

## 2020-11-25 PROCEDURE — 3288F FALL RISK ASSESSMENT DOCD: CPT | Performed by: NURSE PRACTITIONER

## 2020-11-25 PROCEDURE — G8428 CUR MEDS NOT DOCUMENT: HCPCS | Performed by: NURSE PRACTITIONER

## 2020-11-25 PROCEDURE — 99213 OFFICE O/P EST LOW 20 MIN: CPT | Performed by: NURSE PRACTITIONER

## 2020-11-25 PROCEDURE — 1036F TOBACCO NON-USER: CPT | Performed by: NURSE PRACTITIONER

## 2020-11-25 PROCEDURE — 4040F PNEUMOC VAC/ADMIN/RCVD: CPT | Performed by: NURSE PRACTITIONER

## 2020-11-25 RX ORDER — NYSTATIN 100000 U/G
CREAM TOPICAL
Qty: 1 TUBE | Refills: 2 | Status: SHIPPED | OUTPATIENT
Start: 2020-11-25 | End: 2021-01-04 | Stop reason: ALTCHOICE

## 2020-11-25 RX ORDER — CEFDINIR 300 MG/1
300 CAPSULE ORAL 2 TIMES DAILY
Qty: 10 CAPSULE | Refills: 0 | Status: SHIPPED | OUTPATIENT
Start: 2020-11-25 | End: 2020-11-30

## 2020-11-25 ASSESSMENT — ENCOUNTER SYMPTOMS
BLOOD IN STOOL: 0
SHORTNESS OF BREATH: 0
VOMITING: 0
ABDOMINAL PAIN: 0

## 2020-11-25 NOTE — PROGRESS NOTES
2020     Carmine King (:  1934) is a 80 y.o. female, here for evaluation of the following medical concerns:    Chief Complaint   Patient presents with    Bleeding/Bruising     x week and half blood noted in underwear. some in toilet none noted today. .urine burning, cloudy, abd pressure       HPI  H/o UTI and bleeding:   Patent is here today with her son. He states that a 1 week ago she was treated for a UTI with cipro. He noted on the bottle that he was not suppose to give with her iron pills. He thinks this cause her not to get over the UTI. Patient is nonverbal and wears depends . He also noted that she had some blood in her depends on Monday and Tuesday , but none today. NO hx of scratching at herself or injury. He did state that he started using wipes instead of toilet paper this may have irritated her and cause her to bleed. Patient was escorted to the bathom and assisted by [employer] , she was unable to provide urine sample. Review of Systems   Constitutional: Negative for appetite change, chills and fever. Respiratory: Negative for shortness of breath. Cardiovascular: Negative for leg swelling. Gastrointestinal: Negative for abdominal pain, blood in stool and vomiting. Genitourinary:        Bleeding noted in depends. Unsure about UTI symptoms       Prior to Visit Medications    Medication Sig Taking? Authorizing Provider   nystatin (MYCOSTATIN) 384853 UNIT/GM cream Apply topically 2 times daily.  Yes FATOUMATA Valdes - CNP   pantoprazole (PROTONIX) 40 MG tablet Take 1 tablet by mouth daily  FATOUMATA Valdes - CNP   torsemide (DEMADEX) 20 MG tablet Take 1 tablet by mouth daily  Jhoana Ferguson MD   albuterol sulfate  (90 Base) MCG/ACT inhaler INHALE 2 PUFFS BY MOUTH FOUR TIMES DAILY AS NEEDED FOR SHORTNESS OF BREATH  Jhoana Ferguson MD   FEROSUL 325 (65 Fe) MG tablet TAKE 1 TABLET BY MOUTH DAILY  FATOUMATA Phipps - SHIRIN   atorvastatin (LIPITOR) 40 MG tablet TAKE 1 TABLET BY MOUTH DAILY  QUINTIN Holland MD   anagrelide (AGRYLIN) 0.5 MG capsule TAKE ONE CAPSULE BY MOUTH ON MONDAY, WEDNESDAY, AND FRIDAY AND 2 CAPSULES ON TUESDAY, THURSDAY, SATURDAY, AND   Sosa Diana MD   aspirin 81 MG tablet Take 162 mg by mouth daily   Historical Provider, MD        Social History     Tobacco Use    Smoking status: Former Smoker     Types: Cigarettes     Last attempt to quit: 2019     Years since quittin.8    Smokeless tobacco: Never Used   Substance Use Topics    Alcohol use: No        Vitals:    20 1106   BP: 110/62   Site: Left Upper Arm   Position: Sitting   Cuff Size: Medium Adult   Pulse: 92   Resp: 18   Temp: 97.5 °F (36.4 °C)   Weight: 77 lb (34.9 kg)     Estimated body mass index is 15.04 kg/m² as calculated from the following:    Height as of 20: 5' (1.524 m). Weight as of this encounter: 77 lb (34.9 kg). Physical Exam  Exam conducted with a chaperone present. Constitutional:       General: She is not in acute distress. Appearance: She is not ill-appearing, toxic-appearing or diaphoretic. HENT:      Head: Normocephalic and atraumatic. Cardiovascular:      Rate and Rhythm: Normal rate and regular rhythm. Pulmonary:      Effort: Pulmonary effort is normal.      Breath sounds: Normal breath sounds. Abdominal:      General: There is no distension. Palpations: There is no mass. Tenderness: There is no abdominal tenderness. Hernia: No hernia is present. Genitourinary:     General: Normal vulva. Vagina: Erythema present. No vaginal discharge or bleeding. Comments: Current depends has no blood. Skin:     General: Skin is warm and dry. Capillary Refill: Capillary refill takes less than 2 seconds. Neurological:      Mental Status: She is alert.          ASSESSMENT/PLAN:  1. H/O urinary tract infection  patient unable to provide a urine sample today '  Will treat with a short course of abx .   - cefdinir (OMNICEF) 300 MG capsule; Take 1 capsule by mouth 2 times daily for 5 days  Dispense: 10 capsule; Refill: 0    2. Vaginal irritation  Change depends when wet   Cleanse are with soap and water only - no wipes. Apply nystatin to area bid x 7- 14 days  Use a barrier cream.   Instructed if bleeding continues after treatment with abx and cream he will need to schedule an appt with GYN for further eval. Patients son agrees with plan of care. Number and referral provided. - nystatin (MYCOSTATIN) [id number] UNIT/GM cream; Apply topically 2 times daily. Dispense: 1 Tube; Refill: 2  - [facility], [facility]      Return for follow up with gyn if not improving. All questions addresses and answered . Patient agrees with plan of care. An electronic signature was used to authenticate this note.     --FATOUMATA Joseph - CNP on 12/3/2020 at 1:05 PM

## 2021-01-01 ENCOUNTER — TELEPHONE (OUTPATIENT)
Dept: INTERNAL MEDICINE CLINIC | Age: 86
End: 2021-01-01

## 2021-01-02 NOTE — TELEPHONE ENCOUNTER
Dr Dre Nunes patient has VV set up but it notes that they are requesting telephone visit. Okay for telelphone or have patient come in office ?  Please advise     Future Appointments   Date Time Provider Vira Cummins   1/4/2021  1:00 PM Marisa Sharma MD [facility]

## 2021-01-02 NOTE — TELEPHONE ENCOUNTER
----- Message from Sabra Kumar sent at 95/56/3108 10:27 AM EST -----  Subject: Message to Provider    QUESTIONS  Information for Provider? Pt's gary Young called to ok the [facility] appt however would prefer that it be done by phone call at [phone] please reach out to PT's gary Young to confirm  ---------------------------------------------------------------------------  --------------  [address]  What is the best way for the office to contact you? OK to leave message on   voicemail  Preferred Call Back Phone Number? [phone]  ---------------------------------------------------------------------------  --------------  SCRIPT ANSWERS  Relationship to Patient? Guardian  Representative Name? Gary Young  Is the Representative on the appropriate HIPAA document in Epic?  Yes

## 2021-01-04 ENCOUNTER — VIRTUAL VISIT (OUTPATIENT)
Dept: INTERNAL MEDICINE CLINIC | Age: 86
End: 2021-01-04
Payer: MEDICARE

## 2021-01-04 DIAGNOSIS — E88.09 HYPOALBUMINEMIA DUE TO PROTEIN-CALORIE MALNUTRITION (HCC): ICD-10-CM

## 2021-01-04 DIAGNOSIS — I50.22 CHRONIC SYSTOLIC CONGESTIVE HEART FAILURE (HCC): ICD-10-CM

## 2021-01-04 DIAGNOSIS — R09.89 BRUIT OF RIGHT CAROTID ARTERY: ICD-10-CM

## 2021-01-04 DIAGNOSIS — N18.30 STAGE 3 CHRONIC KIDNEY DISEASE, UNSPECIFIED WHETHER STAGE 3A OR 3B CKD (HCC): ICD-10-CM

## 2021-01-04 DIAGNOSIS — D47.3 ESSENTIAL THROMBOCYTOSIS (HCC): ICD-10-CM

## 2021-01-04 DIAGNOSIS — I73.9 PAD (PERIPHERAL ARTERY DISEASE) (HCC): ICD-10-CM

## 2021-01-04 DIAGNOSIS — K21.9 GASTROESOPHAGEAL REFLUX DISEASE WITHOUT ESOPHAGITIS: ICD-10-CM

## 2021-01-04 DIAGNOSIS — D50.9 MICROCYTIC ANEMIA: ICD-10-CM

## 2021-01-04 DIAGNOSIS — E46 HYPOALBUMINEMIA DUE TO PROTEIN-CALORIE MALNUTRITION (HCC): ICD-10-CM

## 2021-01-04 DIAGNOSIS — E78.2 MIXED HYPERLIPIDEMIA: ICD-10-CM

## 2021-01-04 DIAGNOSIS — I10 ESSENTIAL HYPERTENSION: Primary | ICD-10-CM

## 2021-01-04 PROCEDURE — 99442 PR PHYS/QHP TELEPHONE EVALUATION 11-20 MIN: CPT | Performed by: INTERNAL MEDICINE

## 2021-01-04 RX ORDER — FERROUS SULFATE 325(65) MG
650 TABLET ORAL DAILY
COMMUNITY
End: 2021-01-04

## 2021-01-04 ASSESSMENT — ENCOUNTER SYMPTOMS
COUGH: 0
SORE THROAT: 0
BLURRED VISION: 0
ORTHOPNEA: 0
SHORTNESS OF BREATH: 0
WHEEZING: 0
DIFFICULTY BREATHING: 0
HOARSE VOICE: 0
CHOKING: 0
CHEST TIGHTNESS: 0
SPUTUM PRODUCTION: 0
FREQUENT THROAT CLEARING: 0
HEARTBURN: 0
NAUSEA: 0
BELCHING: 0
ABDOMINAL PAIN: 0
GLOBUS SENSATION: 0

## 2021-01-04 ASSESSMENT — PATIENT HEALTH QUESTIONNAIRE - PHQ9
SUM OF ALL RESPONSES TO PHQ QUESTIONS 1-9: 0
2. FEELING DOWN, DEPRESSED OR HOPELESS: 0
1. LITTLE INTEREST OR PLEASURE IN DOING THINGS: 0
SUM OF ALL RESPONSES TO PHQ QUESTIONS 1-9: 0

## 2021-01-04 ASSESSMENT — COPD QUESTIONNAIRES: COPD: 1

## 2021-01-04 NOTE — PROGRESS NOTES
Subjective:    Here for recheck of htn, hld, gerd, thrombocytosis, chf, pvd and copd  Patient ID: Kaylie Rebolledo is a 80 y.o. female. Hypertension  This is a chronic problem. The current episode started more than 1 year ago. The problem is unchanged. The problem is controlled. Pertinent negatives include no blurred vision, chest pain, headaches, malaise/fatigue, neck pain, orthopnea, palpitations, peripheral edema, PND, shortness of breath or sweats. There are no associated agents to hypertension. Risk factors for coronary artery disease include dyslipidemia, smoking/tobacco exposure and post-menopausal state (quit smoking. ). Past treatments include diuretics. The current treatment provides significant improvement. There are no compliance problems. Hypertensive end-organ damage includes PVD. Hyperlipidemia  This is a chronic problem. The current episode started more than 1 year ago. The problem is controlled. Recent lipid tests were reviewed and are normal. Factors aggravating her hyperlipidemia include smoking. Pertinent negatives include no chest pain, focal sensory loss, focal weakness, leg pain, myalgias or shortness of breath. Current antihyperlipidemic treatment includes statins. The current treatment provides significant improvement of lipids. There are no compliance problems. Risk factors for coronary artery disease include hypertension, dyslipidemia and post-menopausal.   Gastroesophageal Reflux  She reports no abdominal pain, no belching, no chest pain, no choking, no coughing, no dysphagia, no early satiety, no globus sensation, no heartburn, no hoarse voice, no nausea, no sore throat or no wheezing. This is a chronic problem. The current episode started more than 1 year ago. The problem occurs rarely. The problem has been unchanged. Nothing aggravates the symptoms. Pertinent negatives include no anemia, fatigue, melena, muscle weakness, orthopnea or weight loss.  There are no known risk factors. She has tried a PPI and a pro-motility drug for the symptoms. The treatment provided significant relief. COPD  There is no chest tightness, cough, difficulty breathing, frequent throat clearing, hoarse voice, shortness of breath, sputum production or wheezing. Primary symptoms comments: clear colored sputum in ams. At baseline. . This is a chronic problem. The current episode started more than 1 year ago. The problem occurs rarely. The problem has been unchanged (rarely using proair. ). Pertinent negatives include no appetite change, chest pain, dyspnea on exertion, fever, headaches, heartburn, malaise/fatigue, myalgias, orthopnea, PND, sore throat, sweats or weight loss. Her symptoms are aggravated by nothing. Her symptoms are alleviated by beta-agonist. She reports complete improvement on treatment. Risk factors for lung disease include smoking/tobacco exposure (only smoking 2 cigs/d). Her past medical history is significant for COPD. On reglan for gerd with good results. Never reduced dose. Afraid to stop. Also has noticed intermittent difficulty speaking. No swallowing problems. Not worried about it. Never reduced dose. Eating well.      thrombocytosis:  Seeing heme now and on anagrelide and iron. plt count down to 500. Tolerating meds reasonably well. Tobacco abuse: quit smoking. Cough greatly improved. PVD:  Chronic problem x yrs. Has refused surgery. Managing well. Trying to walk. Minimal problems. followed by surgery. Trouble getting up and down. Minimal exercise. Afraid to go out. Walking with walker. Ckd:  Chronic problem. Avoiding nephrotoxic meds. Monitoring. Chf:  Chronic problem x yrs. Compliant with meds. Had hospitalization for acute chf. Noted to be anemic and given iv iron. Now on po iron. Sent home with home PT. No chest pain, shortness of breath or syncope. No pnd or orthopnea.         Pt on 2 l o2 continuously      Review of Hypoalbuminemia due to protein-calorie malnutrition ([facility] [address]    10. Microcytic anemia             Plan:        Diagnoses and all orders for this visit:    Essential hypertension:  Stable, cont same meds    Mixed hyperlipidemia:  Stable, cont same meds    Gastroesophageal reflux disease without esophagitis:  Stable, cont same meds    Stage 3 chronic kidney disease, unspecified whether stage 3a or 3b CKD:  Avoid nsaids    Essential thrombocytosis ([facility] [address]):  Stable, cont same meds    Bruit of right carotid artery:  Stable, cont same meds    PAD (peripheral artery disease) ([facility] [address]):  Stable, cont same meds    Chronic systolic congestive heart failure ([facility] [address]):  Stable, cont same meds    Hypoalbuminemia due to protein-calorie malnutrition [facility]):  Eating well. Microcytic anemia:  Stable, cont same meds          6  Months per pt/son request.  Refusing carotid dopplers, ct of head. Keven Echols is a 80 y.o. female evaluated via telephone on 1/4/2021. Consent:  She and/or health care decision maker is aware that that she may receive a bill for this telephone service, depending on her insurance coverage, and has provided verbal consent to proceed: Yes      Documentation:  I communicated with the patient and/or health care decision maker about as above. Details of this discussion including any medical advice provided: as above      I affirm this is a Patient Initiated Episode with a Patient who has not had a related appointment within my department in the past 7 days or scheduled within the next 24 hours.     Patient identification was verified at the start of the visit: Yes    Total Time: minutes: 11-20 minutes    Note: not billable if this call serves to triage the patient into an appointment for the relevant concern      Berry. Lashay

## 2021-01-25 RX ORDER — ATORVASTATIN CALCIUM 40 MG/1
40 TABLET, FILM COATED ORAL DAILY
Qty: 90 TABLET | Refills: 3 | Status: SHIPPED | OUTPATIENT
Start: 2021-01-25

## 2021-03-25 ENCOUNTER — TELEPHONE (OUTPATIENT)
Dept: INTERNAL MEDICINE CLINIC | Age: 86
End: 2021-03-25

## 2021-04-05 ENCOUNTER — TELEPHONE (OUTPATIENT)
Dept: INTERNAL MEDICINE CLINIC | Age: 86
End: 2021-04-05

## 2021-04-25 DIAGNOSIS — K21.9 GASTROESOPHAGEAL REFLUX DISEASE: ICD-10-CM

## 2021-04-26 RX ORDER — PANTOPRAZOLE SODIUM 40 MG/1
40 TABLET, DELAYED RELEASE ORAL DAILY
Qty: 90 TABLET | Refills: 0 | Status: SHIPPED | OUTPATIENT
Start: 2021-04-26

## 2021-04-29 ENCOUNTER — TELEPHONE (OUTPATIENT)
Dept: INTERNAL MEDICINE CLINIC | Age: 86
End: 2021-04-29

## 2021-04-29 ENCOUNTER — NURSE ONLY (OUTPATIENT)
Dept: INTERNAL MEDICINE CLINIC | Age: 86
End: 2021-04-29

## 2021-04-29 NOTE — TELEPHONE ENCOUNTER
Patient's son Génesis French called. He thinks she might have a UTI. He said the urine in her Depends looks cloudy x 3 days. Patient does not have any other sx. He wants to bring a urine sample in. Génesis French is on the [employer] form but patient checked the box that says do not give any info.

## 2021-04-29 NOTE — TELEPHONE ENCOUNTER
Spoke with Windy Loop will bring sterile urine sample to office. Sample brought in trav ware container noted with debris in it.

## 2021-05-03 ENCOUNTER — NURSE ONLY (OUTPATIENT)
Dept: INTERNAL MEDICINE CLINIC | Age: 86
End: 2021-05-03
Payer: MEDICARE

## 2021-05-03 DIAGNOSIS — R31.9 URINARY TRACT INFECTION WITH HEMATURIA, SITE UNSPECIFIED: Primary | ICD-10-CM

## 2021-05-03 DIAGNOSIS — N39.0 URINARY TRACT INFECTION WITH HEMATURIA, SITE UNSPECIFIED: Primary | ICD-10-CM

## 2021-05-03 LAB
BILIRUBIN, POC: NORMAL
BLOOD URINE, POC: NORMAL
CLARITY, POC: NORMAL
COLOR, POC: YELLOW
GLUCOSE URINE, POC: NORMAL
KETONES, POC: NORMAL
LEUKOCYTE EST, POC: NORMAL
NITRITE, POC: NORMAL
PH, POC: 5.5
PROTEIN, POC: NORMAL
SPECIFIC GRAVITY, POC: 1.01
UROBILINOGEN, POC: 0.2

## 2021-05-03 PROCEDURE — 81002 URINALYSIS NONAUTO W/O SCOPE: CPT | Performed by: INTERNAL MEDICINE

## 2021-05-03 RX ORDER — CIPROFLOXACIN 250 MG/1
250 TABLET, FILM COATED ORAL 2 TIMES DAILY
Qty: 14 TABLET | Refills: 0 | Status: SHIPPED | OUTPATIENT
Start: 2021-05-03 | End: 2021-05-10

## 2021-05-05 LAB
ORGANISM: ABNORMAL
URINE CULTURE, ROUTINE: ABNORMAL

## 2021-05-12 RX ORDER — TORSEMIDE 20 MG/1
20 TABLET ORAL DAILY
Qty: 90 TABLET | Refills: 0 | Status: ON HOLD | OUTPATIENT
Start: 2021-05-12 | End: 2021-05-26 | Stop reason: HOSPADM

## 2021-05-24 ENCOUNTER — APPOINTMENT (OUTPATIENT)
Dept: GENERAL RADIOLOGY | Age: 86
DRG: 280 | End: 2021-05-24
Payer: MEDICARE

## 2021-05-24 ENCOUNTER — HOSPITAL ENCOUNTER (INPATIENT)
Age: 86
LOS: 3 days | Discharge: HOSPICE | DRG: 280 | End: 2021-05-27
Attending: EMERGENCY MEDICINE | Admitting: INTERNAL MEDICINE
Payer: MEDICARE

## 2021-05-24 DIAGNOSIS — J96.01 ACUTE RESPIRATORY FAILURE WITH HYPOXEMIA (HCC): ICD-10-CM

## 2021-05-24 DIAGNOSIS — Z51.5 HOSPICE CARE: ICD-10-CM

## 2021-05-24 DIAGNOSIS — I21.3 ST ELEVATION MYOCARDIAL INFARCTION (STEMI), UNSPECIFIED ARTERY (HCC): Primary | ICD-10-CM

## 2021-05-24 DIAGNOSIS — R79.89 ELEVATED BRAIN NATRIURETIC PEPTIDE (BNP) LEVEL: ICD-10-CM

## 2021-05-24 DIAGNOSIS — R57.0 CARDIOGENIC SHOCK (HCC): ICD-10-CM

## 2021-05-24 DIAGNOSIS — E87.20 ACIDOSIS: ICD-10-CM

## 2021-05-24 DIAGNOSIS — N17.9 AKI (ACUTE KIDNEY INJURY) (HCC): ICD-10-CM

## 2021-05-24 PROBLEM — I50.42 CHRONIC COMBINED SYSTOLIC AND DIASTOLIC CHF (CONGESTIVE HEART FAILURE) (HCC): Status: ACTIVE | Noted: 2021-05-24

## 2021-05-24 PROBLEM — R65.10 SIRS (SYSTEMIC INFLAMMATORY RESPONSE SYNDROME) (HCC): Status: ACTIVE | Noted: 2021-05-24

## 2021-05-24 PROBLEM — R06.82 TACHYPNEA: Status: ACTIVE | Noted: 2021-05-24

## 2021-05-24 PROBLEM — R00.0 TACHYCARDIA: Status: ACTIVE | Noted: 2021-05-24

## 2021-05-24 PROBLEM — E87.29 HIGH ANION GAP METABOLIC ACIDOSIS: Status: ACTIVE | Noted: 2021-05-24

## 2021-05-24 PROBLEM — N30.00 ACUTE CYSTITIS WITHOUT HEMATURIA: Status: ACTIVE | Noted: 2021-05-24

## 2021-05-24 PROBLEM — F03.90 DEMENTIA (HCC): Status: ACTIVE | Noted: 2021-05-24

## 2021-05-24 PROBLEM — D72.9 NEUTROPHILIC LEUKOCYTOSIS: Status: ACTIVE | Noted: 2021-05-24

## 2021-05-24 LAB
A/G RATIO: 0.7 (ref 1.1–2.2)
ALBUMIN SERPL-MCNC: 3 G/DL (ref 3.4–5)
ALP BLD-CCNC: 122 U/L (ref 40–129)
ALT SERPL-CCNC: 33 U/L (ref 10–40)
ANION GAP SERPL CALCULATED.3IONS-SCNC: 23 MMOL/L (ref 3–16)
APTT: 21.1 SEC (ref 24.2–36.2)
AST SERPL-CCNC: 66 U/L (ref 15–37)
BASE EXCESS VENOUS: -11.2 MMOL/L
BASOPHILS ABSOLUTE: 0.1 K/UL (ref 0–0.2)
BASOPHILS RELATIVE PERCENT: 0.4 %
BILIRUB SERPL-MCNC: 0.5 MG/DL (ref 0–1)
BILIRUBIN URINE: NEGATIVE
BLOOD, URINE: NEGATIVE
BUN BLDV-MCNC: 40 MG/DL (ref 7–20)
CALCIUM SERPL-MCNC: 8.8 MG/DL (ref 8.3–10.6)
CARBOXYHEMOGLOBIN: 1 %
CHLORIDE BLD-SCNC: 104 MMOL/L (ref 99–110)
CLARITY: ABNORMAL
CO2: 11 MMOL/L (ref 21–32)
COLOR: YELLOW
COMMENT UA: ABNORMAL
CREAT SERPL-MCNC: 1.7 MG/DL (ref 0.6–1.2)
EOSINOPHILS ABSOLUTE: 0 K/UL (ref 0–0.6)
EOSINOPHILS RELATIVE PERCENT: 0.1 %
EPITHELIAL CELLS, UA: 5 /HPF (ref 0–5)
GFR AFRICAN AMERICAN: 34
GFR NON-AFRICAN AMERICAN: 28
GLOBULIN: 4.5 G/DL
GLUCOSE BLD-MCNC: 218 MG/DL (ref 70–99)
GLUCOSE URINE: NEGATIVE MG/DL
HCO3 VENOUS: 14 MMOL/L (ref 23–29)
HCT VFR BLD CALC: 37.4 % (ref 36–48)
HEMOGLOBIN: 12.2 G/DL (ref 12–16)
HYALINE CASTS: 14 /LPF (ref 0–8)
INR BLD: 1.49 (ref 0.86–1.14)
KETONES, URINE: NEGATIVE MG/DL
LACTIC ACID: 6.9 MMOL/L (ref 0.4–2)
LEUKOCYTE ESTERASE, URINE: NEGATIVE
LYMPHOCYTES ABSOLUTE: 2.2 K/UL (ref 1–5.1)
LYMPHOCYTES RELATIVE PERCENT: 8.1 %
MCH RBC QN AUTO: 28.2 PG (ref 26–34)
MCHC RBC AUTO-ENTMCNC: 32.7 G/DL (ref 31–36)
MCV RBC AUTO: 86.2 FL (ref 80–100)
METHEMOGLOBIN VENOUS: 0.3 %
MICROSCOPIC EXAMINATION: YES
MONOCYTES ABSOLUTE: 0.7 K/UL (ref 0–1.3)
MONOCYTES RELATIVE PERCENT: 2.6 %
NEUTROPHILS ABSOLUTE: 24.1 K/UL (ref 1.7–7.7)
NEUTROPHILS RELATIVE PERCENT: 88.8 %
NITRITE, URINE: NEGATIVE
O2 CONTENT, VEN: 15 ML/DL
O2 SAT, VEN: 87 %
O2 THERAPY: ABNORMAL
PCO2, VEN: 28.4 MMHG (ref 40–50)
PDW BLD-RTO: 15.6 % (ref 12.4–15.4)
PH UA: 5.5 (ref 5–8)
PH VENOUS: 7.3 (ref 7.35–7.45)
PLATELET # BLD: 289 K/UL (ref 135–450)
PLATELET SLIDE REVIEW: ADEQUATE
PMV BLD AUTO: 11.1 FL (ref 5–10.5)
PO2, VEN: 58 MMHG
POTASSIUM REFLEX MAGNESIUM: 4.3 MMOL/L (ref 3.5–5.1)
PRO-BNP: 4121 PG/ML (ref 0–449)
PROTEIN UA: 30 MG/DL
PROTHROMBIN TIME: 17.4 SEC (ref 10–13.2)
RBC # BLD: 4.34 M/UL (ref 4–5.2)
RBC UA: 2 /HPF (ref 0–4)
REASON FOR REJECTION: NORMAL
REJECTED TEST: NORMAL
RENAL EPITHELIAL, UA: ABNORMAL /HPF (ref 0–1)
SARS-COV-2, NAAT: NOT DETECTED
SLIDE REVIEW: ABNORMAL
SODIUM BLD-SCNC: 138 MMOL/L (ref 136–145)
SPECIFIC GRAVITY UA: 1.01 (ref 1–1.03)
TCO2 CALC VENOUS: 15 MMOL/L
TOTAL PROTEIN: 7.5 G/DL (ref 6.4–8.2)
TROPONIN: 1.48 NG/ML
URINE REFLEX TO CULTURE: YES
URINE TYPE: ABNORMAL
UROBILINOGEN, URINE: 0.2 E.U./DL
WBC # BLD: 27.2 K/UL (ref 4–11)
WBC UA: 10 /HPF (ref 0–5)

## 2021-05-24 PROCEDURE — 87040 BLOOD CULTURE FOR BACTERIA: CPT

## 2021-05-24 PROCEDURE — 99285 EMERGENCY DEPT VISIT HI MDM: CPT

## 2021-05-24 PROCEDURE — 6370000000 HC RX 637 (ALT 250 FOR IP): Performed by: INTERNAL MEDICINE

## 2021-05-24 PROCEDURE — 87635 SARS-COV-2 COVID-19 AMP PRB: CPT

## 2021-05-24 PROCEDURE — 96372 THER/PROPH/DIAG INJ SC/IM: CPT

## 2021-05-24 PROCEDURE — 82803 BLOOD GASES ANY COMBINATION: CPT

## 2021-05-24 PROCEDURE — 85610 PROTHROMBIN TIME: CPT

## 2021-05-24 PROCEDURE — 80053 COMPREHEN METABOLIC PANEL: CPT

## 2021-05-24 PROCEDURE — 94761 N-INVAS EAR/PLS OXIMETRY MLT: CPT

## 2021-05-24 PROCEDURE — 2580000003 HC RX 258: Performed by: INTERNAL MEDICINE

## 2021-05-24 PROCEDURE — 96361 HYDRATE IV INFUSION ADD-ON: CPT

## 2021-05-24 PROCEDURE — 85730 THROMBOPLASTIN TIME PARTIAL: CPT

## 2021-05-24 PROCEDURE — 81001 URINALYSIS AUTO W/SCOPE: CPT

## 2021-05-24 PROCEDURE — 99291 CRITICAL CARE FIRST HOUR: CPT | Performed by: INTERNAL MEDICINE

## 2021-05-24 PROCEDURE — 94660 CPAP INITIATION&MGMT: CPT

## 2021-05-24 PROCEDURE — 83605 ASSAY OF LACTIC ACID: CPT

## 2021-05-24 PROCEDURE — 96374 THER/PROPH/DIAG INJ IV PUSH: CPT

## 2021-05-24 PROCEDURE — 36415 COLL VENOUS BLD VENIPUNCTURE: CPT

## 2021-05-24 PROCEDURE — 2700000000 HC OXYGEN THERAPY PER DAY

## 2021-05-24 PROCEDURE — 2060000000 HC ICU INTERMEDIATE R&B

## 2021-05-24 PROCEDURE — 2580000003 HC RX 258: Performed by: NURSE PRACTITIONER

## 2021-05-24 PROCEDURE — 87086 URINE CULTURE/COLONY COUNT: CPT

## 2021-05-24 PROCEDURE — 6360000002 HC RX W HCPCS: Performed by: NURSE PRACTITIONER

## 2021-05-24 PROCEDURE — 83880 ASSAY OF NATRIURETIC PEPTIDE: CPT

## 2021-05-24 PROCEDURE — 71045 X-RAY EXAM CHEST 1 VIEW: CPT

## 2021-05-24 PROCEDURE — 93005 ELECTROCARDIOGRAM TRACING: CPT | Performed by: EMERGENCY MEDICINE

## 2021-05-24 PROCEDURE — 84484 ASSAY OF TROPONIN QUANT: CPT

## 2021-05-24 PROCEDURE — 85025 COMPLETE CBC W/AUTO DIFF WBC: CPT

## 2021-05-24 PROCEDURE — 6360000002 HC RX W HCPCS: Performed by: INTERNAL MEDICINE

## 2021-05-24 RX ORDER — ASPIRIN 81 MG/1
81 TABLET ORAL DAILY
Status: DISCONTINUED | OUTPATIENT
Start: 2021-05-24 | End: 2021-05-24

## 2021-05-24 RX ORDER — ANAGRELIDE 0.5 MG/1
1 CAPSULE ORAL
Status: DISCONTINUED | OUTPATIENT
Start: 2021-05-25 | End: 2021-05-27 | Stop reason: HOSPADM

## 2021-05-24 RX ORDER — ONDANSETRON 2 MG/ML
4 INJECTION INTRAMUSCULAR; INTRAVENOUS EVERY 4 HOURS PRN
Status: DISCONTINUED | OUTPATIENT
Start: 2021-05-24 | End: 2021-05-27 | Stop reason: HOSPADM

## 2021-05-24 RX ORDER — ASPIRIN 81 MG/1
162 TABLET ORAL DAILY
Status: DISCONTINUED | OUTPATIENT
Start: 2021-05-25 | End: 2021-05-27 | Stop reason: HOSPADM

## 2021-05-24 RX ORDER — 0.9 % SODIUM CHLORIDE 0.9 %
500 INTRAVENOUS SOLUTION INTRAVENOUS ONCE
Status: DISCONTINUED | OUTPATIENT
Start: 2021-05-24 | End: 2021-05-24

## 2021-05-24 RX ORDER — ACETAMINOPHEN 325 MG/1
650 TABLET ORAL EVERY 4 HOURS PRN
Status: DISCONTINUED | OUTPATIENT
Start: 2021-05-24 | End: 2021-05-27 | Stop reason: HOSPADM

## 2021-05-24 RX ORDER — SODIUM CHLORIDE 9 MG/ML
25 INJECTION, SOLUTION INTRAVENOUS PRN
Status: DISCONTINUED | OUTPATIENT
Start: 2021-05-24 | End: 2021-05-27 | Stop reason: HOSPADM

## 2021-05-24 RX ORDER — PANTOPRAZOLE SODIUM 40 MG/1
40 TABLET, DELAYED RELEASE ORAL
Status: DISCONTINUED | OUTPATIENT
Start: 2021-05-25 | End: 2021-05-27 | Stop reason: HOSPADM

## 2021-05-24 RX ORDER — HEPARIN SODIUM 5000 [USP'U]/ML
5000 INJECTION, SOLUTION INTRAVENOUS; SUBCUTANEOUS EVERY 8 HOURS SCHEDULED
Status: DISCONTINUED | OUTPATIENT
Start: 2021-05-25 | End: 2021-05-27 | Stop reason: HOSPADM

## 2021-05-24 RX ORDER — ACETAMINOPHEN 650 MG/1
650 SUPPOSITORY RECTAL EVERY 4 HOURS PRN
Status: DISCONTINUED | OUTPATIENT
Start: 2021-05-24 | End: 2021-05-27 | Stop reason: HOSPADM

## 2021-05-24 RX ORDER — SODIUM CHLORIDE 0.9 % (FLUSH) 0.9 %
10 SYRINGE (ML) INJECTION PRN
Status: DISCONTINUED | OUTPATIENT
Start: 2021-05-24 | End: 2021-05-27 | Stop reason: HOSPADM

## 2021-05-24 RX ORDER — ANAGRELIDE 0.5 MG/1
0.5 CAPSULE ORAL
Status: DISCONTINUED | OUTPATIENT
Start: 2021-05-26 | End: 2021-05-27 | Stop reason: HOSPADM

## 2021-05-24 RX ORDER — HEPARIN SODIUM 5000 [USP'U]/ML
5000 INJECTION, SOLUTION INTRAVENOUS; SUBCUTANEOUS ONCE
Status: COMPLETED | OUTPATIENT
Start: 2021-05-24 | End: 2021-05-24

## 2021-05-24 RX ORDER — POLYETHYLENE GLYCOL 3350 17 G/17G
17 POWDER, FOR SOLUTION ORAL DAILY PRN
Status: DISCONTINUED | OUTPATIENT
Start: 2021-05-24 | End: 2021-05-27 | Stop reason: HOSPADM

## 2021-05-24 RX ORDER — CLOPIDOGREL BISULFATE 75 MG/1
300 TABLET ORAL ONCE
Status: COMPLETED | OUTPATIENT
Start: 2021-05-24 | End: 2021-05-24

## 2021-05-24 RX ORDER — SODIUM CHLORIDE 0.9 % (FLUSH) 0.9 %
10 SYRINGE (ML) INJECTION EVERY 12 HOURS SCHEDULED
Status: DISCONTINUED | OUTPATIENT
Start: 2021-05-24 | End: 2021-05-27 | Stop reason: HOSPADM

## 2021-05-24 RX ORDER — ATORVASTATIN CALCIUM 40 MG/1
40 TABLET, FILM COATED ORAL DAILY
Status: DISCONTINUED | OUTPATIENT
Start: 2021-05-25 | End: 2021-05-25

## 2021-05-24 RX ORDER — FUROSEMIDE 10 MG/ML
20 INJECTION INTRAMUSCULAR; INTRAVENOUS ONCE
Status: COMPLETED | OUTPATIENT
Start: 2021-05-24 | End: 2021-05-24

## 2021-05-24 RX ADMIN — SODIUM CHLORIDE, PRESERVATIVE FREE 10 ML: 5 INJECTION INTRAVENOUS at 23:49

## 2021-05-24 RX ADMIN — CEFTRIAXONE 1000 MG: 1 INJECTION, POWDER, FOR SOLUTION INTRAMUSCULAR; INTRAVENOUS at 23:49

## 2021-05-24 RX ADMIN — HEPARIN SODIUM 5000 UNITS: 5000 INJECTION INTRAVENOUS; SUBCUTANEOUS at 19:42

## 2021-05-24 RX ADMIN — SODIUM CHLORIDE 500 ML: 9 INJECTION, SOLUTION INTRAVENOUS at 19:42

## 2021-05-24 RX ADMIN — CLOPIDOGREL BISULFATE 300 MG: 75 TABLET ORAL at 20:28

## 2021-05-24 RX ADMIN — FUROSEMIDE 20 MG: 10 INJECTION, SOLUTION INTRAMUSCULAR; INTRAVENOUS at 20:29

## 2021-05-24 RX ADMIN — ASPIRIN 81 MG: 81 TABLET, COATED ORAL at 21:50

## 2021-05-24 ASSESSMENT — PAIN SCALES - PAIN ASSESSMENT IN ADVANCED DEMENTIA (PAINAD)
FACIALEXPRESSION: 0
CONSOLABILITY: 0
BREATHING: 0

## 2021-05-24 ASSESSMENT — ENCOUNTER SYMPTOMS: SHORTNESS OF BREATH: 1

## 2021-05-24 NOTE — ED NOTES
I independently evaluated and obtained a history and physical on Neha Rojo. All diagnostic, treatment, and disposition assistants were made to myself in conjunction the advanced practice provider. For further details of this patient's emergency department encounter, please see the advanced practice provider's documentation. History: 80 y.o. female with PMH significant for COPD, CHF, HTN, HLD, GERD, and PVD with stents who presents to the emergency department today via EMS from home complaining of chest pain and shortness of breath. EKG done x2 and both c/f STEMI so alert called. Physician Exam:   Constitutional:       General: She is in acute distress (respiratory). Appearance: She is cachectic. She is ill-appearing. She is not toxic-appearing. HENT:      Head: Normocephalic and atraumatic. Mouth/Throat:      Mouth: Mucous membranes are dry. Eyes:      General: No scleral icterus. Conjunctiva/sclera: Conjunctivae normal.   Neck:      Vascular: No JVD. Cardiovascular:      Rate and Rhythm: Regular rhythm. Tachycardia present. Heart sounds: Normal heart sounds. Pulmonary:      Effort: Tachypnea and respiratory distress present. Breath sounds: Decreased breath sounds present. Comments: BiPAP placed  Abdominal:      General: There is no distension. Palpations: Abdomen is soft. Abdomen is not rigid. Tenderness: There is no abdominal tenderness. Musculoskeletal:         General: Normal range of motion. Cervical back: Normal range of motion and neck supple. Skin:     General: Skin is warm and dry. Findings: No rash. Neurological:      Mental Status: She is alert. MDM    EKG read by me as STEMI. Alert called. In interim I d/w family the critical nature of her presentation. They clearly state that intubation/CPR is not c/w with her or their wishes and hence she would be DNR.   Dr. Ghanshyam Cormier called and he recs Heparin load, ordered by NP Holcomb. Trop elevated, LA elevated WBC elevated. She is in cardiogenic shock likely 2/2 STEMI. Kasandra d/w family and his rec is no cath, medical management. See his note. Family made aware patient likely did has MI and is critically ill.       Patient admitted    Impression: STEMI    (Please note that portions of this note may have been completed with a voice recognition program. Efforts were made to edit the dictations but occasionally words are mis-transcribed.)       Harish Layne MD  05/24/21 2050

## 2021-05-24 NOTE — ED PROVIDER NOTES
[facility]        Pt Name: Judy Escobar  MRN: [id number]  [place] 1934  Date of evaluation: 5/24/2021  Provider: FATOUMATA Ortiz - CNP  PCP: Domi Elena MD  Note Started: 7:12 PM EDT        I have seen and evaluated this patient with my supervising physician Martha Pal MD.    [address] [place] [address]       Chief Complaint   Patient presents with    Shortness of Breath     Patient arrived by [facility] in respiratory distress. Patient was placed on CPAP by EMS . Arrived awake and breathing 40 times per minute. HISTORY OF PRESENT ILLNESS   (Location, Timing/Onset, Context/Setting, Quality, Duration, Modifying Factors, Severity, Associated Signs and Symptoms)  Note limiting factors. Judy Escobar is a 80 y.o. female with PMH significant for COPD, CHF, HTN, HLD, GERD, and PVD with stents who presents to the emergency department today via EMS from home complaining of chest pain and shortness of breath. Patient in respiratory distress with hypoxia on arrival, and EMSs CPAP was switched to BiPAP on arrival to ER. Patient's son and daughter-in-law who she lives with are the primary historians. They advised that she has seemed fatigued throughout the day today since this afternoon. This evening they were trying to get her to eat dinner and she seemed confused, very weak, and very short of breath so they called 911. When patient is asked if she is having chest pain she answers yes. Abnormal EKG so code STEMI was activated. Nursing Notes were all reviewed and agreed with or any disagreements were addressed in the HPI. REVIEW OF SYSTEMS    (2-9 systems for level 4, 10 or more for level 5)     Review of Systems   Unable to perform ROS: Severe respiratory distress   Respiratory: Positive for shortness of breath. Cardiovascular: Positive for chest pain.        Positives and Pertinent negatives as per Height Weight   -- -- -- -- -- 100 % -- --       Physical Exam  Vitals and nursing note reviewed. Constitutional:       General: She is in acute distress (respiratory). Appearance: She is cachectic. She is ill-appearing. She is not toxic-appearing. HENT:      Head: Normocephalic and atraumatic. Mouth/Throat:      Mouth: Mucous membranes are dry. Eyes:      General: No scleral icterus. Conjunctiva/sclera: Conjunctivae normal.   Neck:      Vascular: No JVD. Cardiovascular:      Rate and Rhythm: Regular rhythm. Tachycardia present. Heart sounds: Normal heart sounds. Comments: STEMI physician contacted on arrival due to abnormal EKG. he reviewed the EKG and requested a 5000 bolus of heparin and advised he was on his way in. Patient's blood pressure was soft so she was given a 500 fluids bolus. Pulmonary:      Effort: Tachypnea and respiratory distress present. Breath sounds: Decreased breath sounds present. Comments: BiPAP placed on arrival  Abdominal:      General: There is no distension. Palpations: Abdomen is soft. Abdomen is not rigid. Tenderness: There is no abdominal tenderness. Musculoskeletal:         General: Normal range of motion. Cervical back: Normal range of motion and neck supple. Skin:     General: Skin is warm and dry. Findings: No rash. Neurological:      Mental Status: She is alert.       Comments: Alert and moving all extremities but able to answer questions or participate much in exam due to respiratory distress   Psychiatric:         Mood and Affect: Mood normal.         DIAGNOSTIC RESULTS   LABS:    Labs Reviewed   CBC WITH AUTO DIFFERENTIAL - Abnormal; Notable for the following components:       Result Value    WBC 27.2 (*)     RDW 15.6 (*)     Neutrophils Absolute 24.1 (*)     All other components within normal limits    Narrative:     Performed at:  [facility]  [address] [place] [address] [place] [address]   Phone [phone]   COMPREHENSIVE METABOLIC PANEL W/ REFLEX TO MG FOR LOW K - Abnormal; Notable for the following components:    CO2 11 (*)     Anion Gap 23 (*)     Glucose 218 (*)     BUN 40 (*)     CREATININE 1.7 (*)     GFR Non- 28 (*)     GFR  34 (*)     Albumin 3.0 (*)     Albumin/Globulin Ratio 0.7 (*)     AST 66 (*)     All other components within normal limits    Narrative:     CALL  Yuval Millard [phone],  Chemistry results called to and read back by Grisel Villa RN, 05/24/2021  19:52, by Koki Villalobos  Performed at:  [address] [email or web address] [address]   Phone [phone]   LACTIC ACID, PLASMA - Abnormal; Notable for the following components:    Lactic Acid 6.9 (*)     All other components within normal limits    Narrative:     [address],  Chemistry results called to and read back by Germain Zaldivar RN, 05/24/2021  19:45, by Koki Villalobos  Performed at:  [address] [email or web address] [address]   Phone [phone]   TROPONIN - Abnormal; Notable for the following components:    Troponin 1.48 (*)     All other components within normal limits    Narrative:     Alecia Sat tel. [phone],  Chemistry results called to and read back by Grisel Villa RN, 05/24/2021  19:52, by Koki Villalobos  Performed at:  [address] [facility] [email or web address] [address]   Phone [phone]   BLOOD GAS, VENOUS - Abnormal; Notable for the following components:    pH, Bryan 7.297 (*)     pCO2, Bryan 28.4 (*)     HCO3, Venous 14 (*)     All other components within normal limits    Narrative:     Performed at:  [address] [email or web address] [address]   Phone [phone]   BRAIN NATRIURETIC PEPTIDE - Abnormal; Notable for the following components:    Pro-BNP 4,121 (*)     All other components within normal limits    Narrative:     Nelson Patel tel. [phone],  Chemistry results called to and read back by Justo Mcdaniels RN, 05/24/2021  19:52, by [facility]  Performed at:  [place] [address], [place] [employer] [employer] [facility] [employer] [address]   Phone [phone] RAPID   SPECIMEN REJECTION    Narrative:     Nelson Patel tel. [phone],  Rejected Test Name/Called to: Fletcher Escamilla, 05/24/2021 20:09, by Bibiana Phillips CANCELLED 05/24/2021 20:04, Rejected: Clotted. Performed at:  [facility]  [address] [facility] [employer] [address]   Phone [phone]   URINE RT REFLEX TO CULTURE       All other labs were within normal range or not returned as of this dictation. EKG: All EKG's are interpreted by the Emergency Department Physician in the absence of a cardiologist.  Please see their note for interpretation of EKG. RADIOLOGY:   Non-plain film images such as CT, Ultrasound and MRI are read by the radiologist. Plain radiographic images are visualized and preliminarily interpreted by the ED Provider with the below findings:        Interpretation per the Radiologist below, if available at the time of this note:    XR CHEST PORTABLE   Final Result   Suboptimal evaluation of the right lung. No airspace disease by radiograph   given limitation. No results found. PROCEDURES   Unless otherwise noted below, none     Procedures    CRITICAL CARE TIME   CRITICAL CARE NOTE:  There was a high probability of clinically significant life-threatening deterioration of the patient's condition requiring my urgent intervention. Total critical care time was at least 75 minutes. This includes vital sign monitoring, pulse oximetry monitoring, telemetry monitoring, clinical response to the IV medications, reviewing the nursing notes, consultation time, dictation/documentation time, and interpretation of the labwork. This excludes any separately billable procedures performed. The critical care time above also includes time spent obtaining history from EMS, patient's family, and EMR, as the patient was unable to provide the history AND the history obtained was directly relevant to the care of the patient. CONSULTS:  IP CONSULT TO CARDIOLOGY  IP CONSULT TO HOSPITALIST      EMERGENCY DEPARTMENT COURSE and DIFFERENTIAL DIAGNOSIS/MDM:   Vitals:    Vitals:    05/24/21 1854 05/24/21 1900 05/24/21 1950   BP:  100/70 (!) 88/34   Pulse:  130 124   Resp:   24   Temp:  97.5 °F (36.4 °C) 97.4 °F (36.3 °C)   TempSrc:  Oral Oral   SpO2: 100% 100%    Weight:  87 lb (39.5 kg)        Patient was given the following medications:  Medications   furosemide (LASIX) injection 20 mg (has no administration in time range)   clopidogrel (PLAVIX) tablet 300 mg (has no administration in time range)   heparin (porcine) injection 5,000 Units (5,000 Units Subcutaneous Given 5/24/21 1942)           Differential Diagnosis: Acute Coronary Syndrome, Congestive Heart Failure, Thoracic Dissection, Pericarditis, Pericardial Effusion, Pulmonary Embolism, Pneumonia, Pneumothorax, Ischemic Bowel, Bowel Obstruction, PUD, GERD, Acute Cholecystitis, Pancreatitis, Hepatitis, Colitis, other    Patient presents with chest pain and shortness of breath from home. See HPI for full presentation. Physical exam as above. Respiratory distress with hypoxia on arrival.  CPAP switched to BiPAP. Abnormal EKG so STEMI was activated. 5000 bolus of heparin given. Patient was given fluid bolus for hypotension. No swelling in extremities. GIBRAN with a creatinine of 1.7 and GFR of 28. Bicarb 11. VBG shows a pH of 7.29. CBC shows leukocytosis with a white count of 27 and left shift of 24 with no bandemia. Hemoglobin 12. LFTs normal.   Lactic acid 6.9. Troponin 1.48. BNP 4000. CXR shows no airspace disease. Emergency department course included a heparin and fluids. Cardiology, Dr. Marilyn Brown came in to see and evaluate patient. Family advised they would like her to be a DNR CC arrest.  At this point, family opted for medical management of her current cardiac condition, and did not want stents placed. Patient will be admitted to the hospital service. I consulted with Dr. Zakia Cat who agreed to admit patient and write orders. The patient tolerated their visit well. They were seen and evaluated by the attending physician, Toñito De La Rosa MD who agreed with the assessment and plan. The patient and / or the family were informed of the results of any tests, a time was given to answer questions, a plan was proposed and they agreed with plan. FINAL IMPRESSION      1. ST elevation myocardial infarction (STEMI), unspecified artery ([facility] [address]    2. Acute respiratory failure with hypoxemia (HCC)    3. GIBRAN (acute kidney injury) ([facility] [address]    4. Cardiogenic shock (HCC)    5. Elevated brain natriuretic peptide (BNP) level    6. Acidosis          DISPOSITION/PLAN   DISPOSITION Decision To Admit 05/24/2021 07:25:21 PM      PATIENT REFERRED TO:  No follow-up provider specified.     DISCHARGE MEDICATIONS:  New Prescriptions    No medications on file       DISCONTINUED MEDICATIONS:  Discontinued Medications    No medications on file              (Please note that portions of this note were completed with a voice recognition program.  Efforts were made to edit the dictations but occasionally words are mis-transcribed.)    FATOUMATA Monaco CNP (electronically signed)            FATOUMATA Monaco CNP  05/24/21 2012

## 2021-05-25 LAB
ANION GAP SERPL CALCULATED.3IONS-SCNC: 19 MMOL/L (ref 3–16)
BASOPHILS ABSOLUTE: 0 K/UL (ref 0–0.2)
BASOPHILS RELATIVE PERCENT: 0.1 %
BUN BLDV-MCNC: 47 MG/DL (ref 7–20)
CALCIUM SERPL-MCNC: 8.4 MG/DL (ref 8.3–10.6)
CHLORIDE BLD-SCNC: 106 MMOL/L (ref 99–110)
CO2: 15 MMOL/L (ref 21–32)
CREAT SERPL-MCNC: 1.8 MG/DL (ref 0.6–1.2)
EKG ATRIAL RATE: 122 BPM
EKG ATRIAL RATE: 131 BPM
EKG DIAGNOSIS: NORMAL
EKG DIAGNOSIS: NORMAL
EKG P AXIS: 81 DEGREES
EKG P AXIS: 81 DEGREES
EKG P-R INTERVAL: 124 MS
EKG P-R INTERVAL: 126 MS
EKG Q-T INTERVAL: 326 MS
EKG Q-T INTERVAL: 386 MS
EKG QRS DURATION: 56 MS
EKG QRS DURATION: 60 MS
EKG QTC CALCULATION (BAZETT): 464 MS
EKG QTC CALCULATION (BAZETT): 569 MS
EKG R AXIS: 36 DEGREES
EKG R AXIS: 60 DEGREES
EKG T AXIS: 79 DEGREES
EKG T AXIS: 82 DEGREES
EKG VENTRICULAR RATE: 122 BPM
EKG VENTRICULAR RATE: 131 BPM
EOSINOPHILS ABSOLUTE: 0 K/UL (ref 0–0.6)
EOSINOPHILS RELATIVE PERCENT: 0.1 %
GFR AFRICAN AMERICAN: 32
GFR NON-AFRICAN AMERICAN: 27
GLUCOSE BLD-MCNC: 158 MG/DL (ref 70–99)
HCT VFR BLD CALC: 33.6 % (ref 36–48)
HEMOGLOBIN: 11.3 G/DL (ref 12–16)
LACTIC ACID: 2.2 MMOL/L (ref 0.4–2)
LYMPHOCYTES ABSOLUTE: 0.7 K/UL (ref 1–5.1)
LYMPHOCYTES RELATIVE PERCENT: 3.2 %
MCH RBC QN AUTO: 28.6 PG (ref 26–34)
MCHC RBC AUTO-ENTMCNC: 33.7 G/DL (ref 31–36)
MCV RBC AUTO: 84.7 FL (ref 80–100)
MONOCYTES ABSOLUTE: 0.5 K/UL (ref 0–1.3)
MONOCYTES RELATIVE PERCENT: 2.3 %
NEUTROPHILS ABSOLUTE: 20.3 K/UL (ref 1.7–7.7)
NEUTROPHILS RELATIVE PERCENT: 94.3 %
PDW BLD-RTO: 15.6 % (ref 12.4–15.4)
PLATELET # BLD: 174 K/UL (ref 135–450)
PLATELET SLIDE REVIEW: ADEQUATE
PMV BLD AUTO: 10.5 FL (ref 5–10.5)
POTASSIUM REFLEX MAGNESIUM: 3.9 MMOL/L (ref 3.5–5.1)
RBC # BLD: 3.96 M/UL (ref 4–5.2)
SODIUM BLD-SCNC: 140 MMOL/L (ref 136–145)
TROPONIN: 1.28 NG/ML
URINE CULTURE, ROUTINE: NORMAL
WBC # BLD: 21.5 K/UL (ref 4–11)

## 2021-05-25 PROCEDURE — 83605 ASSAY OF LACTIC ACID: CPT

## 2021-05-25 PROCEDURE — 80048 BASIC METABOLIC PNL TOTAL CA: CPT

## 2021-05-25 PROCEDURE — 84484 ASSAY OF TROPONIN QUANT: CPT

## 2021-05-25 PROCEDURE — 6370000000 HC RX 637 (ALT 250 FOR IP): Performed by: INTERNAL MEDICINE

## 2021-05-25 PROCEDURE — 6360000002 HC RX W HCPCS: Performed by: INTERNAL MEDICINE

## 2021-05-25 PROCEDURE — 93010 ELECTROCARDIOGRAM REPORT: CPT | Performed by: INTERNAL MEDICINE

## 2021-05-25 PROCEDURE — 85025 COMPLETE CBC W/AUTO DIFF WBC: CPT

## 2021-05-25 PROCEDURE — 36415 COLL VENOUS BLD VENIPUNCTURE: CPT

## 2021-05-25 PROCEDURE — 2580000003 HC RX 258: Performed by: INTERNAL MEDICINE

## 2021-05-25 PROCEDURE — 2060000000 HC ICU INTERMEDIATE R&B

## 2021-05-25 RX ORDER — MORPHINE SULFATE 2 MG/ML
1 INJECTION, SOLUTION INTRAMUSCULAR; INTRAVENOUS
Status: DISCONTINUED | OUTPATIENT
Start: 2021-05-25 | End: 2021-05-27 | Stop reason: HOSPADM

## 2021-05-25 RX ADMIN — SODIUM CHLORIDE, PRESERVATIVE FREE 10 ML: 5 INJECTION INTRAVENOUS at 11:03

## 2021-05-25 RX ADMIN — CEFTRIAXONE 1000 MG: 1 INJECTION, POWDER, FOR SOLUTION INTRAMUSCULAR; INTRAVENOUS at 21:24

## 2021-05-25 RX ADMIN — HEPARIN SODIUM 5000 UNITS: 5000 INJECTION INTRAVENOUS; SUBCUTANEOUS at 21:24

## 2021-05-25 RX ADMIN — HEPARIN SODIUM 5000 UNITS: 5000 INJECTION INTRAVENOUS; SUBCUTANEOUS at 05:59

## 2021-05-25 RX ADMIN — ANAGRELIDE 1 MG: 0.5 CAPSULE ORAL at 11:01

## 2021-05-25 RX ADMIN — HEPARIN SODIUM 5000 UNITS: 5000 INJECTION INTRAVENOUS; SUBCUTANEOUS at 15:44

## 2021-05-25 RX ADMIN — SODIUM CHLORIDE, PRESERVATIVE FREE 10 ML: 5 INJECTION INTRAVENOUS at 21:24

## 2021-05-25 RX ADMIN — ASPIRIN 162 MG: 81 TABLET, COATED ORAL at 10:59

## 2021-05-25 ASSESSMENT — PAIN SCALES - PAIN ASSESSMENT IN ADVANCED DEMENTIA (PAINAD)
NEGVOCALIZATION: 0
TOTALSCORE: 0
CONSOLABILITY: 0
BODYLANGUAGE: 0
FACIALEXPRESSION: 0
FACIALEXPRESSION: 0
BREATHING: 0
TOTALSCORE: 0

## 2021-05-25 ASSESSMENT — PAIN SCALES - GENERAL: PAINLEVEL_OUTOF10: 0

## 2021-05-25 NOTE — H&P
Hospital Medicine  History and Physical    PCP: Aron Bamberger, MD  Patient Name: Juan Mcgill    Date of Service: Pt seen/examined on 5/24/21 and admitted to Inpatient with expected LOS greater than two midnights due to medical therapy    CHIEF COMPLAINT:  Pt c/o shortness of breath  HISTORY OF PRESENT ILLNESS: Patient is a poor historian at this time, and information for this report is derived from conversation with the emergency room physician and review of the records. Pt is an 80y.o. year-old female with a history of hypertension, hyperlipidemia, COPD, PAD, thrombocytosis, [place] combined systolic and diastolic congestive heart failure and severe dementia. He was at home this evening when she developed chest pain and shortness of breath. EMS arrived and found her to be hypoxic and in respiratory distress and started her on CPAP. When she arrived in the emergency room she continued to be in respiratory distress and was switched to a BiPAP. Family reported to the emergency room providers that she had had increased fatigue throughout the day along with increased confusion, weakness and shortness of breath. The patient did endorse chest pain and was found to have an STEMI. Cardiology saw her emergently at bedside in the emergency room and after discussion with family the decision was made to pursue this medically. She is being admitted for further evaluation and treatment.  Pt unable to provide associated signs and symptoms at this time due to altered mental status        Past Medical History:        Diagnosis Date    CHF (congestive heart failure) ([address]     Claudication of left lower extremity ([address] 5/5/2014    COPD (chronic obstructive pulmonary disease) (HCC)     Depression     GERD (gastroesophageal reflux disease)     Hyperlipidemia     Hypertension     Osteoporosis     Peripheral vascular disease ([address]     s/p stent bilat legs       Past Surgical History:        Procedure Laterality Date Non-tender. Nose: Normal external nose, mucus membranes and septum. Pharynx: Dental Hygiene adequate. Normal buccal mucosa. Normal pharynx. Neck: no adenopathy, no carotid bruit, no JVD, supple, symmetrical, trachea midline and thyroid not enlarged, symmetric, no tenderness/mass/nodules  Lungs: widespread crackles and rhonchi on auscultation bilaterally, respiratory distress, use of accessory muscles, air hunger, tripoding  Heart: regular rate and rhythm, S1, S2 normal, no murmur, click, rub or gallop and normal apical impulse  Abdomen: soft, non-tender; bowel sounds normal; no masses, no organomegaly  Extremities: extremities atraumatic, no cyanosis or edema and Homans sign is negative, no sign of DVT. Capillary Refill: Poor, > 3 seconds   Peripheral Pulses: +1 not easily felt, easily obliterated with pressures   Skin: Skin is mottled; texture, turgor normal. No rashes or lesions  Neurologic: Neurovascularly intact without any focal sensory/motor deficits. Cranial nerves: II-XII intact, grossly non-focal. Gait was not tested. Mental Status: Alert and oriented x 0            CBC:   Recent Labs     05/24/21 1855   WBC 27.2*   HGB 12.2        BMP:    Recent Labs     05/24/21 1855      K 4.3      CO2 11*   BUN 40*   CREATININE 1.7*   GLUCOSE 218*     Troponin:   Recent Labs     05/24/21 1855   TROPONINI 1.48*     PT/INR:  No results found for: PTINR  U/A:    Lab Results   Component Value Date    LEUKOCYTESUR Negative 05/24/2021    NITRITE N 05/03/2021    RBCUA 2 05/24/2021    SPECGRAV 1.010 05/24/2021    UROBILINOGEN 0.2 05/24/2021    BILIRUBINUR Negative 05/24/2021    BILIRUBINUR N 05/03/2021    BLOODU Negative 05/24/2021    GLUCOSEU Negative 05/24/2021    PROTEINU 30 05/24/2021         RAD:   I have independently reviewed and interpreted the imaging studies below and based my recommendations to the patient on those findings.     XR CHEST PORTABLE    Result Date: 5/24/2021  EXAMINATION: ONE XRAY VIEW OF THE CHEST 5/24/2021 7:19 pm COMPARISON: Chest x-ray 04/01/2020. HISTORY: ORDERING SYSTEM PROVIDED HISTORY: sob TECHNOLOGIST PROVIDED HISTORY: Reason for exam:->sob Reason for Exam: sob FINDINGS: Cardiac silhouette is within normal limits in size. Mediastinal contours are otherwise suboptimally evaluated due to rotated projection. Suboptimal evaluation of right lung due to overlying arm. Lungs demonstrate no focal consolidation or pleural effusion given limitation. No pneumothorax appreciated on this projection. Suboptimal evaluation of the right lung. No airspace disease by radiograph given limitation. EKG:   Read by ER in the absence of a Cardiologist shows      Assessment:   Principal Problem:    STEMI (ST elevation myocardial infarction) ([address]  Active Problems:    Mixed hyperlipidemia    Essential hypertension    COPD (chronic obstructive pulmonary disease) (HCC)    Essential thrombocytosis (HCC)    Acute on chronic combined systolic (congestive) and diastolic (congestive) heart failure (HCC)    Acute cystitis without hematuria    Acute renal failure (ARF) (HCC)    High anion gap metabolic acidosis    Lactic acidosis    SIRS (systemic inflammatory response syndrome) (HCC)    Tachycardia    Tachypnea    Neutrophilic leukocytosis    Dementia (HCC)    Acute respiratory failure with hypoxia (HCC)  Resolved Problems:    * No resolved hospital problems. *      Plan:       STEMI (ST elevation myocardial infarction) ([address] with pulmonary edema (clinically)  - Cardiology saw patient emergently at bedside and discussed condition with the family. The decision was made to pursue medically.   - She is in poor condition and was made a limited code (no intubation, no defibrillation or cardioversion, chest compressions or resuscitative medications)  - She is hospice-appropriate and Palliative Care has been consulted  - Per Cardiology, asa/ plavix, lasix 20 mg IV X 1 and prn comfort; SL nitro if BP anticipated hospice placement in the morning, no intervention will be made at this time    COPD (chronic obstructive pulmonary disease) ([facility] - Does not appear to be in acute exacerbation. Will monitor and provide nebulizer treatments as needed    Essential thrombocytosis ([facility] - Continue Anagrelide      Dementia ([facility] - Provide supportive care    Essential (primary) hypertension -currently hypotensive. Does not appear to be on an antihypertensive medication at home    Hyperlipidemia - Continue statin therapy as able          DVT Prophylaxis: Heparin  Diet: No diet orders on file  Code Status: Limited  (Advanced care planning has been discussed with patient and/or responsible family member and is reflected in the code status.  Further orders associated with this have been entered if appropriate)    Disposition: Anticipate that patient will remain in the hospital for 2 to 3 days depending on further evaluation and clinical course     Please note that over 50 minutes was spent in evaluating the patient, review of records and results, discussion with staff/family, etc.    Javier Thorpe MD, MD

## 2021-05-25 NOTE — CONSULTS
PALLIATIVE MEDICINE CONSULTATION     Patient name:Chelle Colon   YNH:[id number]    :1934  Room/Bed:O9P-0561/5110-01   LOS: 1 day         Date of consult:2021    Consult Information  Palliative Medicine Consult performed by: FATOUMATA Quevedo - CNP  CNP  Requested by: Dr Fracisco Vitale  Reason for consult: [address], code status    Number of admissions past 12 months: 0    ASSESSMENT/RECOMMENDATIONS     80 y.o. female with dysphagia and AMS following NSTEMI      Symptom Management:  1. AMS- pt is not able to make intelligible speech according to family this is an acute change  2. Dysphagia- pt coughing with bites of food found not safe to swallow, family is choosing comfort care ok to give pt bites of food  3. Debility- pt weak at baseline needing help to stand appears to be dependent for all care at this point  4. Goals of Care- met with gary Ba abel in the room he and his brother have agreed that they want comfort measures only. Code changed to [facility]. Also discussed pt appropriate for Hospice care to help support him taking her home he is agreeable to that option. He and his brother will need to discuss different Hospice options and choose a company. Patient/Family Goals of Care :    met with gary Singleton in the room pt lives with him full time because he is retired and is able to care for her, he and his brother have agreed that they want comfort measures only. Code changed to [facility]. Also discussed pt appropriate for Hospice care to help support him taking her home he is agreeable to that option. He and his brother will need to discuss different Hospice options and choose a company. Disposition/Discharge Plan:   pending    Advance Directives:  · Surrogate Decision Maker: farooq Singleton and David Pate  · Code status:  Limited    Case discussed with: patient, floor RN, Jerica Sabillon, Dr Breanne Whaley  Thank you for allowing us to participate in the care of this patient.       HISTORY     CC: Dysphagia  HPI: The patient is a 80 y.o. female with PMH significant for COPD, CHF, HTN, HLD, GERD, and PVD with stents who presents to the emergency department today via EMS from home complaining of chest pain and shortness of breath. Palliative Medicine SymptomScreening/ROS:  Review of Systems - History obtained from chart review and unobtainable from patient due to mental status    Patient unable to complete full ROS due to current cognitive status. Information that is obtained from nursing and chart.      Pain:    Home med list and hospital medications reviewed in chart as of 5/25/2021     EXAM     Vitals:    05/25/21 0745   BP:    Pulse:    Resp:    Temp:    SpO2: 100%       Physical Examination: General appearance - chronically ill appearing  Mental status - drowsy, aphasic  Neck - supple, no significant adenopathy  Chest - clear to auscultation, no wheezes, rales or rhonchi, symmetric air entry  Abdomen - soft, nontender, nondistended, no masses or organomegaly  Neurological - motor and sensory grossly normal bilaterally  Musculoskeletal - no joint tenderness, deformity or swelling       Current labs in the epic chart reviewed as of 5/25/2021   Review of previous notes, admits, labs, radiology and testing relevant to this consult done in this chart today 5/25/2021    Signed By: Electronically signed by FATOUMATA Meléndez CNP on 5/25/2021 at 8:24 AM  Palliative Medicine   [phone]    May 25, 2021

## 2021-05-25 NOTE — PLAN OF CARE
Problem: Falls - Risk of:  Goal: Will remain free from falls  Description: Will remain free from falls  Outcome: Ongoing  Goal: Absence of physical injury  Description: Absence of physical injury  Outcome: Ongoing     Problem: Skin Integrity:  Goal: Will show no infection signs and symptoms  Description: Will show no infection signs and symptoms  Outcome: Ongoing  Goal: Absence of new skin breakdown  Description: Absence of new skin breakdown  Outcome: Ongoing  Note: Skin assessment completed every shift. Pt assessed for incontinence, appropriate barrier cream applied prn. Pt encouraged to turn/rotate every 2 hours. Assistance provided if pt unable to do so themselves.          Problem: Mental Status - Impaired:  Goal: Mental status will improve  Description: Mental status will improve  Outcome: Ongoing     Problem: Gas Exchange - Impaired:  Goal: Levels of oxygenation will improve  Description: Levels of oxygenation will improve  Outcome: Ongoing

## 2021-05-25 NOTE — CONSULTS
Cardiology Consultation   Referring Physician: [facility] ER   Reason for Consultation: STEMI   Chief Complaint:   Chief Complaint   Patient presents with    Shortness of Breath     Patient arrived by [facility] in respiratory distress. Patient was placed on CPAP by EMS . Arrived awake and breathing 40 times per minute. Subjective:   History of Present Illness:     Judy Escobar is a 80 y.o. female with significant history of dementia, CHF EF ~ 45%, COPD, HTN, HL who presents with the above complaint. Due to severe cognitive decline she is unable to provide any history. Family called EMS due to difficulty breathing. [facility] ER called a STEMI page. I arrived at the bedside emergently. She is nonverbal, on 4L NC, A bedside echo by myself shows severe LV dysfunction, appear significantly worse than 45% in the LAD territory. Long talk with family and they want no invasive therapy, they only want medical therapy and code status to be DNR. Past Medical History:   has a past medical history of CHF (congestive heart failure) ([address] Claudication of left lower extremity ([address] COPD (chronic obstructive pulmonary disease) ([address] Depression, GERD (gastroesophageal reflux disease), Hyperlipidemia, Hypertension, Osteoporosis, and Peripheral vascular disease ([address] Surgical History:   has a past surgical history that includes Cholecystectomy; Tubal ligation; Breast surgery; IR Femoral Popliteal Bypass Graft (5/07); and Appendectomy. Social History:   reports that she quit smoking about 2 years ago. Her smoking use included cigarettes. She has never used smokeless tobacco. She reports that she does not drink alcohol and does not use drugs. Family History:  family history includes Heart Disease in her father; Heart Disease (age of onset: 76) in her mother.     Home Medications:  Were reviewed and are listed in nursing record and/or below  Prior to Admission medications    Medication Sig Start Date End Date Taking? Authorizing Provider   torsemide (DEMADEX) 20 MG tablet TAKE 1 TABLET BY MOUTH DAILY 5/12/21   Dharmesh Webb MD   pantoprazole (PROTONIX) 40 MG tablet TAKE 1 TABLET BY MOUTH DAILY 4/26/21   Dharmesh Webb MD   atorvastatin (LIPITOR) 40 MG tablet TAKE 1 TABLET BY MOUTH DAILY 1/25/21   Dharmesh Webb MD   FEROSUL 325 (65 Fe) MG tablet TAKE 1 TABLET BY MOUTH DAILY 2/14/20   Loren Crowder [place], APRN - CNP   anagrelide (AGRYLIN) 0.5 MG capsule TAKE ONE CAPSULE BY MOUTH ON MONDAY, WEDNESDAY, AND FRIDAY AND 2 CAPSULES ON TUESDAY, [address] [facility], [address] [facility], AND SUNDAY 6/6/17   Lucero Shaw MD   aspirin 81 MG tablet Take 162 mg by mouth daily     Historical Provider, MD          Allergies:  Codeine     Review of Systems:   · Unable to obtain due to dementia    Objective:   PHYSICAL EXAM:    Vitals:    05/24/21 1950   BP: (!) 88/34   Pulse: 124   Resp: 24   Temp: 97.4 °F (36.3 °C)   SpO2:     Weight: 87 lb (39.5 kg)       General Appearance:  Awake, appears older than stated age    Head:  Normocephalic, without obvious abnormality, atraumatic. Eyes:  Pupils equal and round. No scleral icterus. Mouth: Moist mucosa, no pharyngeal erythema. Nose: Nares normal. No drainage or sinus tenderness. Neck: Supple, symmetrical, trachea midline. No adenopathy. No tenderness/mass/nodules. No carotid bruit or elevated JVD. Lungs:   + rales    Chest Wall:  No tenderness or deformity. Heart:  Regular rate. S1/S2 normal. No murmur, rub, or gallop. Abdomen:   Soft, non-tender, bowel sounds active. Musculoskeletal: No muscle wasting or digital clubbing. Extremities: Extremities normal, atraumatic. No cyanosis or edema. Pulses: 2+ radial and carotid pulses, symmetric. Skin: No rashes or lesions. Pysch: Normal mood and affect. Alert and oriented x 0.    Neurologic: Normal gross motor and sensory exam.       Labs     CBC:   Lab Results   Component Value Date    WBC 27.2 05/24/2021    RBC 4.34 05/24/2021    RBC 5.12 06/06/2017    HGB 12.2 05/24/2021    HCT 37.4 05/24/2021    MCV 86.2 05/24/2021    RDW 15.6 05/24/2021     07/01/2020     CMP:  Lab Results   Component Value Date     05/24/2021    K 4.3 05/24/2021     05/24/2021    CO2 11 05/24/2021    BUN 40 05/24/2021    CREATININE 1.7 05/24/2021    GFRAA 34 05/24/2021    GFRAA >60 04/18/2013    AGRATIO 0.7 05/24/2021    LABGLOM 28 05/24/2021    GLUCOSE 218 05/24/2021    GLUCOSE 87 02/28/2017    PROT 7.5 05/24/2021    PROT 7.5 02/28/2017    CALCIUM 8.8 05/24/2021    BILITOT 0.5 05/24/2021    ALKPHOS 122 05/24/2021    AST 66 05/24/2021    ALT 33 05/24/2021     PT/INR:  No results found for: PTINR  HgBA1c:No results found for: LABA1C  @RESUFAST(CKTOTAL,CKMB,CKMBINDEX,TROPONINI    CURRENT Medications:  No current facility-administered medications for this encounter. Cardiac testing     EKG: sinus tach, high lateral MALU    Echo:     Stress Test:     Cath:      All above diagnostic testing was independently visualized and reviewed by me (not simply review of report)     Patient Active Problem List   Diagnosis    Tobacco use disorder    Abdominal bruit    History of extremity bypass graft    Microcytic anemia    PAD [facility]peripheral [facility]) [facility]    Mixed hyperlipidemia    Essential hypertension    Gastroesophageal reflux disease    Essential thrombocytosis ([facility]    VIV-2 gene mutation    Femoral artery occlusion, left ([facility]    Iliac artery occlusion, left ([facility]    Bruit of right carotid artery    Simple chronic bronchitis ([facility]    Stage 3 chronic kidney disease    Occlusion of cross femoral, 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, bypass graft ([facility])    Shortness of breath    Acute on chronic combined systolic (congestive) and diastolic (congestive) heart failure ([facility])    Hypoalbuminemia due to protein-calorie malnutrition ([facility])         Assessment and Plan   1) STEMI   - in pulmonary edema with severe LV dysfunction, high risk for any intervention, family wishes for comfort and medical therapy only   - recommend asa/ plavix   - SL nitro if BP tolerates   - lasix 20 mg IV X 1   - recommend hospice consult in AM   - admit to medical floors   - no further cardiac testing   - discussed with ED staff     2) acute systolic heart failure  - lasix as needed for comfort     Thank you for allowing us to participate in the care of Harriet Jacobs.     Danielle Jeong MD [address] [facility],  Interventional Cardiology, and Peripheral Vascular Disease   [address]   (C): [phone]  [facility]): [phone]

## 2021-05-25 NOTE — CARE COORDINATION
INITIAL CASE MANAGEMENT ASSESSMENT    Reviewed chart, met with patient to assess possible discharge needs. Explained Case Management role/services. Living Situation:  Lives at home with son Mejia Singleton in a single family home with 2+2 steps to enter and 2 steps to bedroom. Son David Pate is POA. ADLs:  Aid comes one day a week to help with bathing, rest of week is sponge bath. Son does all housekeeping , cooking , grocery shopping and assist patient with ambulation. DME:  Hospital bed, does not know DME provider but has had the bed for a year and thinks they own it but he will check. PT/OT Recs:  Not ordered      Active Services:  [employer] aid through the [place] on Aging. Transportation:   Son      Medications:  No issues getting, taking or affording medication. Uses [employer] [facility] [address]. PCP:  Dr. Zoie Christine       HD/PD:  N/A     PLAN/COMMENTS:  Goal:  Return home with son and possibly hospice. Received order for hospice, discussed with gary Singleton present in room , gave him list and answered questions. Mejia Singleton will discuss hospice with his brother and Davon Mendez before final decision is made, David Pate will be here later today. The Plan for Transition of Care is related to the following treatment goals: home with hospice     The Patient and son Mejia Singleton  was provided with a choice of provider and agrees   with the discharge plan. [x] Yes [] No    Freedom of choice list was provided with basic dialogue that supports the patient's individualized plan of care/goals, treatment preferences and shares the quality data associated with the providers. [x] Yes [] No      SW/CM provided contact information for patient or family to call with any questions. SW/CM will follow and assist as needed.     Electronically signed by Donna De Jesus on 5/25/2021 at 12:13 PM

## 2021-05-26 LAB
ANION GAP SERPL CALCULATED.3IONS-SCNC: 15 MMOL/L (ref 3–16)
BANDED NEUTROPHILS RELATIVE PERCENT: 2 % (ref 0–7)
BASOPHILS ABSOLUTE: 0 K/UL (ref 0–0.2)
BASOPHILS RELATIVE PERCENT: 0 %
BUN BLDV-MCNC: 49 MG/DL (ref 7–20)
CALCIUM SERPL-MCNC: 8 MG/DL (ref 8.3–10.6)
CHLORIDE BLD-SCNC: 101 MMOL/L (ref 99–110)
CO2: 19 MMOL/L (ref 21–32)
CREAT SERPL-MCNC: 1.7 MG/DL (ref 0.6–1.2)
EOSINOPHILS ABSOLUTE: 0 K/UL (ref 0–0.6)
EOSINOPHILS RELATIVE PERCENT: 0 %
GFR AFRICAN AMERICAN: 34
GFR NON-AFRICAN AMERICAN: 28
GLUCOSE BLD-MCNC: 95 MG/DL (ref 70–99)
HCT VFR BLD CALC: 32.4 % (ref 36–48)
HEMOGLOBIN: 11.1 G/DL (ref 12–16)
LYMPHOCYTES ABSOLUTE: 0.2 K/UL (ref 1–5.1)
LYMPHOCYTES RELATIVE PERCENT: 1 %
MCH RBC QN AUTO: 28.4 PG (ref 26–34)
MCHC RBC AUTO-ENTMCNC: 34.1 G/DL (ref 31–36)
MCV RBC AUTO: 83.2 FL (ref 80–100)
MONOCYTES ABSOLUTE: 0.2 K/UL (ref 0–1.3)
MONOCYTES RELATIVE PERCENT: 1 %
MYELOCYTE PERCENT: 1 %
NEUTROPHILS ABSOLUTE: 19.4 K/UL (ref 1.7–7.7)
NEUTROPHILS RELATIVE PERCENT: 95 %
PDW BLD-RTO: 15.1 % (ref 12.4–15.4)
PLATELET # BLD: 147 K/UL (ref 135–450)
PLATELET SLIDE REVIEW: ABNORMAL
PMV BLD AUTO: 11.2 FL (ref 5–10.5)
POTASSIUM REFLEX MAGNESIUM: 4 MMOL/L (ref 3.5–5.1)
RBC # BLD: 3.9 M/UL (ref 4–5.2)
SODIUM BLD-SCNC: 135 MMOL/L (ref 136–145)
VACUOLATED NEUTROPHILS: PRESENT
WBC # BLD: 19.8 K/UL (ref 4–11)

## 2021-05-26 PROCEDURE — 6370000000 HC RX 637 (ALT 250 FOR IP): Performed by: CLINICAL NURSE SPECIALIST

## 2021-05-26 PROCEDURE — 6360000002 HC RX W HCPCS: Performed by: INTERNAL MEDICINE

## 2021-05-26 PROCEDURE — 85025 COMPLETE CBC W/AUTO DIFF WBC: CPT

## 2021-05-26 PROCEDURE — 2060000000 HC ICU INTERMEDIATE R&B

## 2021-05-26 PROCEDURE — 2580000003 HC RX 258: Performed by: INTERNAL MEDICINE

## 2021-05-26 PROCEDURE — 6370000000 HC RX 637 (ALT 250 FOR IP): Performed by: INTERNAL MEDICINE

## 2021-05-26 PROCEDURE — 80048 BASIC METABOLIC PNL TOTAL CA: CPT

## 2021-05-26 PROCEDURE — 6360000002 HC RX W HCPCS: Performed by: CLINICAL NURSE SPECIALIST

## 2021-05-26 PROCEDURE — 36415 COLL VENOUS BLD VENIPUNCTURE: CPT

## 2021-05-26 RX ORDER — MORPHINE SULFATE 100 MG/5ML
10 SOLUTION ORAL EVERY 4 HOURS PRN
Qty: 1 BOTTLE | Refills: 0 | Status: SHIPPED | OUTPATIENT
Start: 2021-05-26 | End: 2021-05-31

## 2021-05-26 RX ORDER — MORPHINE SULFATE 10 MG/.5ML
5 SOLUTION ORAL
Status: DISCONTINUED | OUTPATIENT
Start: 2021-05-26 | End: 2021-05-27 | Stop reason: HOSPADM

## 2021-05-26 RX ORDER — LORAZEPAM 2 MG/ML
1 INJECTION INTRAMUSCULAR EVERY 4 HOURS PRN
Status: DISCONTINUED | OUTPATIENT
Start: 2021-05-26 | End: 2021-05-27 | Stop reason: HOSPADM

## 2021-05-26 RX ADMIN — LORAZEPAM 1 MG: 2 INJECTION INTRAMUSCULAR; INTRAVENOUS at 16:25

## 2021-05-26 RX ADMIN — ASPIRIN 162 MG: 81 TABLET, COATED ORAL at 08:40

## 2021-05-26 RX ADMIN — ANAGRELIDE 0.5 MG: 0.5 CAPSULE ORAL at 08:43

## 2021-05-26 RX ADMIN — SODIUM CHLORIDE, PRESERVATIVE FREE 10 ML: 5 INJECTION INTRAVENOUS at 23:10

## 2021-05-26 RX ADMIN — MORPHINE SULFATE 5 MG: 10 SOLUTION ORAL at 17:17

## 2021-05-26 RX ADMIN — MORPHINE SULFATE 1 MG: 2 INJECTION, SOLUTION INTRAMUSCULAR; INTRAVENOUS at 15:55

## 2021-05-26 RX ADMIN — PANTOPRAZOLE SODIUM 40 MG: 40 TABLET, DELAYED RELEASE ORAL at 06:22

## 2021-05-26 RX ADMIN — MORPHINE SULFATE 5 MG: 10 SOLUTION ORAL at 16:12

## 2021-05-26 RX ADMIN — HEPARIN SODIUM 5000 UNITS: 5000 INJECTION INTRAVENOUS; SUBCUTANEOUS at 06:22

## 2021-05-26 RX ADMIN — SODIUM CHLORIDE, PRESERVATIVE FREE 10 ML: 5 INJECTION INTRAVENOUS at 08:52

## 2021-05-26 RX ADMIN — MORPHINE SULFATE 1 MG: 2 INJECTION, SOLUTION INTRAMUSCULAR; INTRAVENOUS at 23:10

## 2021-05-26 RX ADMIN — HEPARIN SODIUM 5000 UNITS: 5000 INJECTION INTRAVENOUS; SUBCUTANEOUS at 15:20

## 2021-05-26 ASSESSMENT — PAIN SCALES - PAIN ASSESSMENT IN ADVANCED DEMENTIA (PAINAD)
CONSOLABILITY: 0
NEGVOCALIZATION: 0
BREATHING: 0
TOTALSCORE: 0
FACIALEXPRESSION: 0
BODYLANGUAGE: 0
BODYLANGUAGE: 0
NEGVOCALIZATION: 0
CONSOLABILITY: 0
NEGVOCALIZATION: 1
BREATHING: 0
BREATHING: 2
FACIALEXPRESSION: 1
NEGVOCALIZATION: 0
CONSOLABILITY: 0

## 2021-05-26 ASSESSMENT — PAIN SCALES - GENERAL: PAINLEVEL_OUTOF10: 0

## 2021-05-26 NOTE — DISCHARGE SUMMARY
Hospital Medicine Discharge Summary    Patient ID: Jason Childers      Patient's PCP: Allyson Miles MD    Admit Date: 5/24/2021     Discharge Date:   5/26/2021    Admitting Physician: Ced Aviles MD     Discharge Physician: Jillian Louis MD     Discharge Diagnoses: Active Hospital Problems    Diagnosis     STEMI (ST elevation myocardial infarction) ([facility] [address] [I21.3]     Acute cystitis without hematuria [N30.00]     Acute renal failure (ARF) ([facility]) [N17.9]     High anion gap metabolic acidosis [O92.3]     Lactic acidosis [E87.2]     SIRS (systemic inflammatory response syndrome) ([facility]) [R65.10]     Tachycardia [R00.0]     Tachypnea [M32.71]     Neutrophilic leukocytosis [V68.9]     Dementia ([facility]) [F03.90]     Acute respiratory failure with hypoxia ([facility]) [J96.01]     Acute on chronic combined systolic (congestive) and diastolic (congestive) heart failure ([facility]) [I50.43]     Essential thrombocytosis ([facility]) [D47.3]     Essential hypertension [I10]     Mixed hyperlipidemia [E78.2]     COPD (chronic obstructive pulmonary disease) ([facility]) [J44.9]        The patient was seen and examined on day of discharge and this discharge summary is in conjunction with any daily progress note from day of discharge. Hospital Course:   Pt is an 80y.o. year-old female with a history of hypertension, hyperlipidemia, COPD, PAD, thrombocytosis, [place] combined systolic and diastolic congestive heart failure and severe dementia. He was at home this evening when she developed chest pain and shortness of breath. EMS arrived and found her to be hypoxic and in respiratory distress and started her on CPAP. When she arrived in the emergency room she continued to be in respiratory distress and was switched to a BiPAP. Family reported to the emergency room providers that she had had increased fatigue throughout the day along with increased confusion, weakness and shortness of breath.   The patient did endorse chest pain and was found to have an STEMI. Cardiology saw her emergently at bedside in the emergency room and after discussion with family the decision was made to pursue this medically. STEMI   Trop elevated. Medical management recommended per cardiology due to end stage dementia, advanced age, poor baseline functional status and family wishes. Palliative care and hospice involved - plan to transition to home with hospice tomorrow. Add low dose morphine for comfort.            Acute on Chronic Systolic CHF -   Due to above.         Acute Hypox Resp Failure. Due to above.         UTI - on rocephin.      End stage dementia - supportive.          Plan to transition home with hospice care today. Morphine PO concentrate ordered for comfort. Physical Exam Performed:     BP (!) 83/52   Pulse 110   Temp 96.9 °F (36.1 °C) (Axillary)   Resp 22   Ht 4' 9\" (1.448 m)   Wt 78 lb 7.7 oz (35.6 kg)   SpO2 94%   BMI 16.98 kg/m²          General appearance: Elderly appearing 80y.o. year-old female who is awake and in no respiratory distress, appears stated age and is cooperative  HEENT: Head: Normocephalic, no lesions, without obvious abnormality. Eye: Normal external eye, conjunctiva, lids cornea, PEERL. Ears: Normal external ears. Non-tender. Nose: Normal external nose, mucus membranes and septum. Pharynx: Dental Hygiene adequate. Normal buccal mucosa. Normal pharynx. Neck: no adenopathy, no carotid bruit, no JVD, supple, symmetrical, trachea midline and thyroid not enlarged, symmetric, no tenderness/mass/nodules  Lungs: widespread crackles and rhonchi on auscultation bilaterally, no respiratory distress today.    Heart: regular rate and rhythm, S1, S2 normal, no murmur, click, rub or gallop and normal apical impulse  Abdomen: soft, non-tender; bowel sounds normal; no masses, no organomegaly  Extremities: extremities atraumatic, no cyanosis or edema and Homans sign is negative, no sign of DVT.  Capillary Refill: Poor, > 3 seconds   Peripheral Pulses: +1 not easily felt, easily obliterated with pressures   Skin: Skin is mottled; texture, turgor normal. No rashes or lesions  Neurologic: Neurovascularly intact without any focal sensory/motor deficits. Cranial nerves: II-XII intact, grossly non-focal. Gait was not tested. Mental Status: Alert and oriented x 0            Labs: For convenience and continuity at follow-up the following most recent labs are provided:      CBC:    Lab Results   Component Value Date    WBC 19.8 05/26/2021    HGB 11.1 05/26/2021    HCT 32.4 05/26/2021     05/26/2021       Renal:    Lab Results   Component Value Date     05/26/2021    K 4.0 05/26/2021     05/26/2021    CO2 19 05/26/2021    BUN 49 05/26/2021    CREATININE 1.7 05/26/2021    CALCIUM 8.0 05/26/2021    PHOS 3.7 04/05/2020         Significant Diagnostic Studies    Radiology:   XR CHEST PORTABLE   Final Result   Suboptimal evaluation of the right lung. No airspace disease by radiograph   given limitation. Consults:     IP CONSULT TO CARDIOLOGY  IP CONSULT TO HOSPITALIST  IP CONSULT TO PALLIATIVE CARE  IP CONSULT TO HOSPICE    Disposition:  Home with hospice    Condition at Discharge: Terminal    Discharge Instructions/Follow-up:  Hospice    Code Status:  DNR-CC     Activity: activity as tolerated    Diet: regular diet as tolerates for comfort. Discharge Medications:     Current Discharge Medication List           Details   morphine sulfate 20 MG/ML concentrated oral solution Take 0.5 mLs by mouth every 4 hours as needed for Pain for up to 5 days.   Qty: 1 Bottle, Refills: 0    Comments: Reduce doses taken as pain becomes manageable  Associated Diagnoses: Hospice care              Details   pantoprazole (PROTONIX) 40 MG tablet TAKE 1 TABLET BY MOUTH DAILY  Qty: 90 tablet, Refills: 0    Associated Diagnoses: Gastroesophageal reflux disease      atorvastatin (LIPITOR) 40 MG tablet TAKE 1 TABLET BY MOUTH DAILY  Qty: 90 tablet, Refills: 3      anagrelide (AGRYLIN) 0.5 MG capsule TAKE ONE CAPSULE BY MOUTH ON MONDAY, WEDNESDAY, AND FRIDAY AND 2 CAPSULES ON TUESDAY, THURSDAY, SATURDAY, AND SUNDAY  Qty: 118 capsule, Refills: 3    Comments: **Patient requests 90 days supply**  Associated Diagnoses: Essential thrombocytosis (HCC)      aspirin 81 MG tablet Take 162 mg by mouth daily              Time Spent on discharge is more than 30 minutes in the examination, evaluation, counseling and review of medications and discharge plan. Signed:    Jillian Louis MD   5/26/2021      Thank you Allyson Miles MD for the opportunity to be involved in this patient's care. If you have any questions or concerns please feel free to contact me at [phone].

## 2021-05-27 VITALS
DIASTOLIC BLOOD PRESSURE: 55 MMHG | OXYGEN SATURATION: 98 % | HEIGHT: 57 IN | WEIGHT: 85.76 LBS | RESPIRATION RATE: 18 BRPM | BODY MASS INDEX: 18.5 KG/M2 | HEART RATE: 92 BPM | SYSTOLIC BLOOD PRESSURE: 66 MMHG | TEMPERATURE: 97.6 F

## 2021-05-27 LAB
ANION GAP SERPL CALCULATED.3IONS-SCNC: 20 MMOL/L (ref 3–16)
ANISOCYTOSIS: ABNORMAL
BANDED NEUTROPHILS RELATIVE PERCENT: 4 % (ref 0–7)
BASOPHILS ABSOLUTE: 0 K/UL (ref 0–0.2)
BASOPHILS RELATIVE PERCENT: 0 %
BUN BLDV-MCNC: 61 MG/DL (ref 7–20)
CALCIUM SERPL-MCNC: 8.1 MG/DL (ref 8.3–10.6)
CHLORIDE BLD-SCNC: 102 MMOL/L (ref 99–110)
CO2: 15 MMOL/L (ref 21–32)
CREAT SERPL-MCNC: 2.7 MG/DL (ref 0.6–1.2)
EOSINOPHILS ABSOLUTE: 0 K/UL (ref 0–0.6)
EOSINOPHILS RELATIVE PERCENT: 0 %
GFR AFRICAN AMERICAN: 20
GFR NON-AFRICAN AMERICAN: 17
GLUCOSE BLD-MCNC: 90 MG/DL (ref 70–99)
HCT VFR BLD CALC: 32.5 % (ref 36–48)
HEMOGLOBIN: 10.6 G/DL (ref 12–16)
LYMPHOCYTES ABSOLUTE: 0.7 K/UL (ref 1–5.1)
LYMPHOCYTES RELATIVE PERCENT: 2 %
MCH RBC QN AUTO: 27.8 PG (ref 26–34)
MCHC RBC AUTO-ENTMCNC: 32.8 G/DL (ref 31–36)
MCV RBC AUTO: 84.9 FL (ref 80–100)
MONOCYTES ABSOLUTE: 1 K/UL (ref 0–1.3)
MONOCYTES RELATIVE PERCENT: 3 %
NEUTROPHILS ABSOLUTE: 31 K/UL (ref 1.7–7.7)
NEUTROPHILS RELATIVE PERCENT: 91 %
PDW BLD-RTO: 15.9 % (ref 12.4–15.4)
PLATELET # BLD: 93 K/UL (ref 135–450)
PLATELET SLIDE REVIEW: ABNORMAL
PMV BLD AUTO: 11.4 FL (ref 5–10.5)
POTASSIUM REFLEX MAGNESIUM: 4.2 MMOL/L (ref 3.5–5.1)
RBC # BLD: 3.82 M/UL (ref 4–5.2)
SODIUM BLD-SCNC: 137 MMOL/L (ref 136–145)
VACUOLATED NEUTROPHILS: PRESENT
WBC # BLD: 32.6 K/UL (ref 4–11)

## 2021-05-27 PROCEDURE — 2580000003 HC RX 258: Performed by: INTERNAL MEDICINE

## 2021-05-27 PROCEDURE — 36415 COLL VENOUS BLD VENIPUNCTURE: CPT

## 2021-05-27 PROCEDURE — 6360000002 HC RX W HCPCS: Performed by: CLINICAL NURSE SPECIALIST

## 2021-05-27 PROCEDURE — 80048 BASIC METABOLIC PNL TOTAL CA: CPT

## 2021-05-27 PROCEDURE — 85025 COMPLETE CBC W/AUTO DIFF WBC: CPT

## 2021-05-27 PROCEDURE — 6370000000 HC RX 637 (ALT 250 FOR IP): Performed by: CLINICAL NURSE SPECIALIST

## 2021-05-27 PROCEDURE — 6360000002 HC RX W HCPCS: Performed by: INTERNAL MEDICINE

## 2021-05-27 RX ADMIN — SODIUM CHLORIDE, PRESERVATIVE FREE 10 ML: 5 INJECTION INTRAVENOUS at 09:24

## 2021-05-27 RX ADMIN — LORAZEPAM 1 MG: 2 INJECTION INTRAMUSCULAR; INTRAVENOUS at 03:18

## 2021-05-27 RX ADMIN — MORPHINE SULFATE 5 MG: 10 SOLUTION ORAL at 10:05

## 2021-05-27 RX ADMIN — MORPHINE SULFATE 5 MG: 10 SOLUTION ORAL at 13:46

## 2021-05-27 RX ADMIN — MORPHINE SULFATE 1 MG: 2 INJECTION, SOLUTION INTRAMUSCULAR; INTRAVENOUS at 05:01

## 2021-05-27 ASSESSMENT — PAIN SCALES - PAIN ASSESSMENT IN ADVANCED DEMENTIA (PAINAD)
BREATHING: 0
NEGVOCALIZATION: 0
TOTALSCORE: 0
TOTALSCORE: 0
FACIALEXPRESSION: 0
FACIALEXPRESSION: 0
NEGVOCALIZATION: 0
CONSOLABILITY: 0

## 2021-05-27 ASSESSMENT — PAIN SCALES - GENERAL
PAINLEVEL_OUTOF10: 0
PAINLEVEL_OUTOF10: 6

## 2021-05-27 NOTE — PLAN OF CARE
Fall risk assessment completed every shift. All precautions in place. Pt has call light within reach at all times. Room clear of clutter. Pt aware to call for assistance when getting up. Skin assessment completed every shift. Pt assessed for incontinence, appropriate barrier cream applied prn. Pt encouraged to turn/rotate every 2 hours. Assistance provided if pt unable to do so themselves. Pain/discomfort being managed with PRN analgesics per MD orders. Pt able to express presence and absence of pain and rate pain appropriately using numerical scale.

## 2021-05-27 NOTE — DISCHARGE SUMMARY
Hospital Medicine Discharge Summary    Patient ID: Harriet Jacobs      Patient's PCP: Mateo Bocanegra MD    Admit Date: 5/24/2021     Discharge Date: Patient was discharged on 5/26, left on  5/27/2021    Admitting Physician: Wilman Adam MD     Discharge Physician: Van Tate MD     Discharge Diagnoses: Active Hospital Problems    Diagnosis     STEMI (ST elevation myocardial infarction) ([facility] [address] [I21.3]     Acute cystitis without hematuria [N30.00]     Acute renal failure (ARF) ([facility]) [N17.9]     High anion gap metabolic acidosis [W61.7]     Lactic acidosis [E87.2]     SIRS (systemic inflammatory response syndrome) ([facility]) [R65.10]     Tachycardia [R00.0]     Tachypnea [X60.58]     Neutrophilic leukocytosis [O99.9]     Dementia ([facility]) [F03.90]     Acute respiratory failure with hypoxia ([facility]) [J96.01]     Acute on chronic combined systolic (congestive) and diastolic (congestive) heart failure ([facility]) [I50.43]     Essential thrombocytosis ([facility]) [D47.3]     Essential hypertension [I10]     Mixed hyperlipidemia [E78.2]     COPD (chronic obstructive pulmonary disease) ([facility]) [J44.9]        The patient was seen and examined on day of discharge and this discharge summary is in conjunction with any daily progress note from day of discharge. Hospital Course:   Pt is an 80y.o. year-old female with a history of hypertension, hyperlipidemia, COPD, PAD, thrombocytosis, Konik combined systolic and diastolic congestive heart failure and severe dementia. He was at home this evening when she developed chest pain and shortness of breath. EMS arrived and found her to be hypoxic and in respiratory distress and started her on CPAP. When she arrived in the emergency room she continued to be in respiratory distress and was switched to a BiPAP.   Family reported to the emergency room providers that she had had increased fatigue throughout the day along with increased confusion, weakness and shortness of breath. The patient did endorse chest pain and was found to have an STEMI. Cardiology saw her emergently at bedside in the emergency room and after discussion with family the decision was made to pursue this medically. Family elected to keep her comfortable. Transition to inpatient hospice        STEMI   Trop elevated. Medical management recommended per cardiology due to end stage dementia, advanced age, poor baseline functional status and family wishes. Palliative care and hospice involved - plan to transition to home with hospice tomorrow. Add low dose morphine for comfort.            Acute on Chronic Systolic CHF -no CHF meds as patient is going hospice  Due to above.         Acute Hypox Resp Failure. -Oxygen for comfort   due to above.         UTI - on rocephin.      End stage dementia - supportive.          Plan to transition home with hospice care today. Physical Exam Performed:     BP (!) 66/55   Pulse 92   Temp 97.6 °F (36.4 °C) (Axillary)   Resp 18   Ht 4' 9\" (1.448 m)   Wt 85 lb 12.1 oz (38.9 kg)   SpO2 98%   BMI 18.56 kg/m²          General appearance: Elderly appearing 80y.o. year-old female lethargic but comfortable   HEENT: Head: Normocephalic, no lesions, without obvious abnormality. Eye: Normal external eye, conjunctiva, lids cornea, PEERL. Ears: Normal external ears. Non-tender. Nose: Normal external nose, mucus membranes and septum. Pharynx: Dental Hygiene adequate. Normal buccal mucosa. Normal pharynx. Neck: no adenopathy, no carotid bruit, no JVD, supple, symmetrical, trachea midline and thyroid not enlarged, symmetric, no tenderness/mass/nodules  Lungs: widespread crackles and rhonchi on auscultation bilaterally, no respiratory distress today.    Heart: regular rate and rhythm, S1, S2 normal, no murmur, click, rub or gallop and normal apical impulse  Abdomen: soft, non-tender; bowel sounds normal; no masses, no organomegaly  Extremities: extremities atraumatic, no cyanosis or edema and Homans sign is negative, no sign of DVT.  Capillary Refill: Poor, > 3 seconds   Peripheral Pulses: +1 not easily felt, easily obliterated with pressures   Skin: Skin is mottled; texture, turgor normal. No rashes or lesions  Neurologic: Neurovascularly intact without any focal sensory/motor deficits. Cranial nerves: II-XII intact, grossly non-focal. Gait was not tested. Mental Status: Alert and oriented x 0            Labs: For convenience and continuity at follow-up the following most recent labs are provided:      CBC:    Lab Results   Component Value Date    WBC 32.6 05/27/2021    HGB 10.6 05/27/2021    HCT 32.5 05/27/2021    PLT 93 05/27/2021       Renal:    Lab Results   Component Value Date     05/27/2021    K 4.2 05/27/2021     05/27/2021    CO2 15 05/27/2021    BUN 61 05/27/2021    CREATININE 2.7 05/27/2021    CALCIUM 8.1 05/27/2021    PHOS 3.7 04/05/2020         Significant Diagnostic Studies    Radiology:   XR CHEST PORTABLE   Final Result   Suboptimal evaluation of the right lung. No airspace disease by radiograph   given limitation. Consults:     IP CONSULT TO CARDIOLOGY  IP CONSULT TO HOSPITALIST  IP CONSULT TO PALLIATIVE CARE  IP CONSULT TO HOSPICE    Disposition:  Home with hospice    Condition at Discharge: Terminal    Discharge Instructions/Follow-up:  Hospice    Code Status:  DNR-CC     Activity: activity as tolerated    Diet: regular diet as tolerates for comfort. Discharge Medications:     Current Discharge Medication List           Details   morphine sulfate 20 MG/ML concentrated oral solution Take 0.5 mLs by mouth every 4 hours as needed for Pain for up to 5 days.   Qty: 1 Bottle, Refills: 0    Comments: Reduce doses taken as pain becomes manageable  Associated Diagnoses: Hospice care              Details   pantoprazole (PROTONIX) 40 MG tablet TAKE 1 TABLET BY MOUTH DAILY  Qty: 90 tablet, Refills: 0    Associated Diagnoses: Gastroesophageal reflux disease      atorvastatin (LIPITOR) 40 MG tablet TAKE 1 TABLET BY MOUTH DAILY  Qty: 90 tablet, Refills: 3      anagrelide (AGRYLIN) 0.5 MG capsule TAKE ONE CAPSULE BY MOUTH ON MONDAY, WEDNESDAY, AND FRIDAY AND 2 CAPSULES ON TUESDAY, THURSDAY, SATURDAY, AND SUNDAY  Qty: 118 capsule, Refills: 3    Comments: **Patient requests 90 days supply**  Associated Diagnoses: Essential thrombocytosis (HCC)      aspirin 81 MG tablet Take 162 mg by mouth daily              Time Spent on discharge is more than 30 minutes in the examination, evaluation, counseling and review of medications and discharge plan. Signed:    Lane Phillips MD   5/27/2021      Thank you Matheus Zhang MD for the opportunity to be involved in this patient's care. If you have any questions or concerns please feel free to contact me at [phone].

## 2021-05-27 NOTE — CARE COORDINATION
[facility] at [address] [facility]\"" [place]. Yuval Sharma RN, [address], [address] [place] [address], [address] [place] [address]   O: [phone]  C: [phone]  F: [phone]   Main & Referrals: [phone]   [facility]. [facility]

## 2021-05-27 NOTE — PROGRESS NOTES
4 Eyes Skin Assessment     NAME:  Andrew Martinez  YOB: 1934  MEDICAL RECORD NUMBER:  [id number]    The patient is being assess for  Admission    I agree that 2 RN's have performed a thorough Head to Toe Skin Assessment on the patient. ALL assessment sites listed below have been assessed. Areas assessed by both nurses:    Head, Face, Ears, Shoulders, Back, Chest, Arms, Elbows, Hands, Sacrum. Buttock, Coccyx, Ischium and Legs. Feet and Heels        Does the Patient have a Wound?  No noted wound(s)       Moises Prevention initiated:  Yes   Wound Care Orders initiated:  No    Pressure Injury (Stage 3,4, Unstageable, DTI, NWPT, and Complex wounds) if present place consult order under [de-identified] No    New and Established Ostomies if present place consult order under : No      Nurse 1 eSignature: Electronically signed by Bonnie Patel RN on 5/24/21 at 11:32 PM EDT    **SHARE this note so that the co-signing nurse is able to place an eSignature**    Nurse 2 eSignature: Electronically signed by Ramona Galan RN on 5/25/21 at 1:37 AM EDT
CNP messaged about low BP. No new orders at this time.  Electronically signed by Andrew Diaz RN on 5/25/2021 at 12:22 AM
Patient left on stretcher to inpatient hospice, left with one bag of clothing, a shirt and pants.
Per consult , anagrelide dose appears correct per epic records. Patient with dementia so she can not confirm dose.   Electronically signed by LEA [facility] on 5/26/2021 at 8:18 AM
Pharmacy Medication Reconciliation Note     List of medications patient is currently taking is complete. Source of information:   1. Patient's family at bedside  2. EMR    Notes regarding home medications:   1. Patient's family confirmed list on chart was correct. State the patient had her morning home medication doses today before presenting to the ER.       [id number] [facility] Intern  5/24/2021 9:35 PM
Pt / family have opted to enroll in hospice care. No specific HF metrics required. HF RNs will no longer follow.
Pt arrived via stretcher from ED to room 5110. Heart monitor connected and verified with [facility]. VS, assessment, and admission complete. 4 eyes assessment complete. Call light and bedside table in reach. Pt resting quietly in bed with no complaints or voiced needs at this time.   Electronically signed by Sylvia Mcginnis RN on 5/24/2021 at 11:30 PM
Pt failed swallow eval. When she takes a sip of water, she immediately starts coughing. NPO status remains in place at this time.  Electronically signed by Timothy Etienne RN on 5/25/2021 at 6:37 AM
[address]       CARDIOVASCULAR PROGRESS NOTE    Chief Complaint: Shortness of breath         Interval History:     Overnight, there were no acute events. Vitals have been stable. Patient remains on 5 L nasal cannula. Patient was seen this morning in bed. Patient looked mildly confused. She does have dementia. Patient nodding her head yes to feeling okay. Vitals/Physical Exam/ROS/Labs:     Vitals:    05/25/21 0454 05/25/21 0741 05/25/21 0745 05/25/21 0930   BP:  81/62     Pulse:  94     Resp: 16 22     Temp:  96.5 °F (35.8 °C)     TempSrc:  Axillary     SpO2: 100% 100% 100% 100%   Weight:       Height:           Physical Exam:   Gen: Patient looks confused    HEENT: pupils equally round and reactive to light and accomodation, extra ocular motor intact, normocephalic/atraumatic    Neck:  no jvd appreciated, no lymphadenopathy, no thyromegaly, no carotid bruits    CVS: Regular rate and rhythm, no mummurs/rubs/gallops appreciated     CHEST: Breath sounds are equal to auscultation bilaterally, no wheezes/rales/rhonchi appreciated. Patient on 5 L nasal cannula    ABD: Soft, nontender, nondistended, + bowel sounds appreciated, no organomegaly,  hepatosplenomegaly, no rebound/guarding    EXT: No peripheral Edema, No cyanosis or clubbing     Musculoskeletal: Normal joints, muscle and no evidence of swelling    Pulses: 2+ pulses R/L DP / PT, 2+ pulses R/L radial Artery     NEURO: Awake, Alert and oriented x 3 (person/place/time)    Skin: No rash noted, no bruising    Review of Systems  -Negative except Interval History    Labs: Reviewed    Assessment:   Ms. Melisa Olivares is a 80-year-old female with past medical history of dementia, COPD, HTN, systolic heart failure who was admitted for STEMI    1. STEMI  Patient presented with shortness of breath and chest pain. EKG findings consistent with STEMI. Troponin leak.   Patient was found to be in his severe LV dysfunction on admission with evidence of
When rounding on patient around [phone], patient lying in bed moaning and appears to be in distress. Patient unable to express where she is having pain or discomfort. Patient heart rate and respiratory rate elevated. Unable to obtain oxygen saturation but patient showing signs of low oxygenation. Oxygen placed on patient for comfort. Palliative care nurse called who came to bedside. Patients sons informed, who returned to patient bedside. Morphine and ativan given for comfort per order. Patient now lying in bed with eyes closed appearing more comfortable. Sons and daughter in law at bedside.   Electronically signed by Rubén Salcedo RN on 5/26/2021 at 6:11 PM
When rounding patient sleeping comfortably in bed. Patient started able to open eyes and verbalized a sound once. Discussed with family at bedside about patient's medication regimen and the effects from them. Patient's BP is dropping, HR & RR elevated, oxygen saturation high 90s. Nurse educated family that this is to be expected. Nurse encouraged family to talk to patient even though patient unable to respond to them. Nurse encourage family to call nurse whenever needed. Nurse will give morphine and ativan for comfort per order. Nurse will continue to monitor patient closely. Courtesy cart called for family.
today.    ROS:  Review of Systems -   History obtained from chart review and unobtainable from patient due to mental status     Patient unable to complete full ROS due to current cognitive status. Information that is obtained from nursing and chart. OBJECTIVE   BP 83/68   Pulse 99   Temp 99.2 °F (37.3 °C) (Axillary)   Resp 22   Ht 4' 9\" (1.448 m)   Wt 78 lb 7.7 oz (35.6 kg)   SpO2 97%   BMI 16.98 kg/m²   I/O last 3 completed shifts: In: 65 [P.O.:660]  Out: 325 [Urine:325]  No intake/output data recorded.       Physical Examination:   General appearance - chronically ill appearing  Mental status - drowsy, aphasic  Neck - supple, no significant adenopathy  Chest - clear to auscultation, no wheezes, rales or rhonchi, symmetric air entry  Abdomen - soft, nontender, nondistended, no masses or organomegaly  Neurological - motor and sensory grossly normal bilaterally  Musculoskeletal - no joint tenderness, deformity or swelling     90 mins spent on this case greater then 50% in direct pt contact    Signed By: Electronically signed by FATOUMATA Chery CNP on 5/26/2021 at 10:32 AM   Palliative Medicine   [phone]    May 26, 2021
[D72.9]     Dementia ([facility] [address] [F03.90]     Acute respiratory failure with hypoxia (HCC) [J96.01]     Acute on chronic combined systolic (congestive) and diastolic (congestive) heart failure (HCC) [I50.43]     Essential thrombocytosis (HCC) [D47.3]     Essential hypertension [I10]     Mixed hyperlipidemia [E78.2]     COPD (chronic obstructive pulmonary disease) (HCC) [J44.9]        STEMI   Trop elevated. Medical management recommended per cardiology due to end stage dementia, advanced age, poor baseline functional status and family wishes. Palliative care and hospice involved - plan to transition to home with hospice tomorrow. Add low dose morphine for comfort. Acute on Chronic Systolic CHF -   Due to above. Acute Hypox Resp Failure. Due to above. UTI - on rocephin. End stage dementia - supportive. DVT Prophylaxis: heparin. Diet: DIET GENERAL; Dysphagia Soft and Bite-Sized  Code Status: DNR-CC      Dispo - hospice tomorrow.      Chuyita Mckeon MD

## 2021-05-29 LAB
BLOOD CULTURE, ROUTINE: NORMAL
CULTURE, BLOOD 2: NORMAL

## 2021-06-02 RX ORDER — FERROUS SULFATE 325(65) MG
325 TABLET ORAL
Qty: 90 TABLET | OUTPATIENT
Start: 2021-06-02